# Patient Record
Sex: MALE | Race: WHITE | Employment: OTHER
[De-identification: names, ages, dates, MRNs, and addresses within clinical notes are randomized per-mention and may not be internally consistent; named-entity substitution may affect disease eponyms.]

---

## 2024-07-06 ENCOUNTER — APPOINTMENT (OUTPATIENT)
Facility: HOSPITAL | Age: 89
DRG: 065 | End: 2024-07-06
Payer: OTHER GOVERNMENT

## 2024-07-06 ENCOUNTER — HOSPITAL ENCOUNTER (INPATIENT)
Facility: HOSPITAL | Age: 89
LOS: 5 days | Discharge: NURSING, REHAB OR LONG-TERM CARE | DRG: 065 | End: 2024-07-12
Attending: EMERGENCY MEDICINE | Admitting: INTERNAL MEDICINE
Payer: OTHER GOVERNMENT

## 2024-07-06 DIAGNOSIS — R29.898 WEAKNESS OF LEFT LOWER EXTREMITY: ICD-10-CM

## 2024-07-06 DIAGNOSIS — I63.9 CEREBROVASCULAR ACCIDENT (CVA), UNSPECIFIED MECHANISM (HCC): Primary | ICD-10-CM

## 2024-07-06 DIAGNOSIS — M79.661 RIGHT CALF PAIN: ICD-10-CM

## 2024-07-06 DIAGNOSIS — R42 DIZZINESS: ICD-10-CM

## 2024-07-06 PROBLEM — G45.9 TIA (TRANSIENT ISCHEMIC ATTACK): Status: ACTIVE | Noted: 2024-07-06

## 2024-07-06 LAB
ALBUMIN SERPL-MCNC: 3.6 G/DL (ref 3.5–5)
ALBUMIN/GLOB SERPL: 1.3 (ref 1.1–2.2)
ALP SERPL-CCNC: 72 U/L (ref 45–117)
ALT SERPL-CCNC: 26 U/L (ref 12–78)
ANION GAP SERPL CALC-SCNC: 5 MMOL/L (ref 5–15)
AST SERPL W P-5'-P-CCNC: 14 U/L (ref 15–37)
BASOPHILS # BLD: 0.1 K/UL (ref 0–0.1)
BASOPHILS NFR BLD: 1 % (ref 0–1)
BILIRUB SERPL-MCNC: 0.6 MG/DL (ref 0.2–1)
BUN SERPL-MCNC: 12 MG/DL (ref 6–20)
BUN/CREAT SERPL: 20 (ref 12–20)
CA-I BLD-MCNC: 9.3 MG/DL (ref 8.5–10.1)
CHLORIDE SERPL-SCNC: 108 MMOL/L (ref 97–108)
CO2 SERPL-SCNC: 26 MMOL/L (ref 21–32)
COLLECT DATE STL: NORMAL
CREAT SERPL-MCNC: 0.6 MG/DL (ref 0.7–1.3)
DIFFERENTIAL METHOD BLD: ABNORMAL
EOSINOPHIL # BLD: 0.6 K/UL (ref 0–0.4)
EOSINOPHIL NFR BLD: 9 % (ref 0–7)
ERYTHROCYTE [DISTWIDTH] IN BLOOD BY AUTOMATED COUNT: 13.5 % (ref 11.5–14.5)
GLOBULIN SER CALC-MCNC: 2.8 G/DL (ref 2–4)
GLUCOSE BLD STRIP.AUTO-MCNC: 105 MG/DL (ref 65–100)
GLUCOSE SERPL-MCNC: 120 MG/DL (ref 65–100)
HCT VFR BLD AUTO: 42.2 % (ref 36.6–50.3)
HEMOCCULT SP1 STL QL: NEGATIVE
HGB BLD-MCNC: 14.1 G/DL (ref 12.1–17)
IMM GRANULOCYTES # BLD AUTO: 0 K/UL (ref 0–0.04)
IMM GRANULOCYTES NFR BLD AUTO: 0 % (ref 0–0.5)
INR PPP: 1 (ref 0.9–1.1)
LYMPHOCYTES # BLD: 1.2 K/UL (ref 0.8–3.5)
LYMPHOCYTES NFR BLD: 19 % (ref 12–49)
MCH RBC QN AUTO: 31.5 PG (ref 26–34)
MCHC RBC AUTO-ENTMCNC: 33.4 G/DL (ref 30–36.5)
MCV RBC AUTO: 94.2 FL (ref 80–99)
MONOCYTES # BLD: 0.7 K/UL (ref 0–1)
MONOCYTES NFR BLD: 10 % (ref 5–13)
NEUTS SEG # BLD: 3.9 K/UL (ref 1.8–8)
NEUTS SEG NFR BLD: 61 % (ref 32–75)
NRBC # BLD: 0 K/UL (ref 0–0.01)
NRBC BLD-RTO: 0 PER 100 WBC
PERFORMED BY:: ABNORMAL
PLATELET # BLD AUTO: 188 K/UL (ref 150–400)
PMV BLD AUTO: 9.5 FL (ref 8.9–12.9)
POTASSIUM SERPL-SCNC: 4 MMOL/L (ref 3.5–5.1)
PROT SERPL-MCNC: 6.4 G/DL (ref 6.4–8.2)
PROTHROMBIN TIME: 13.5 SEC (ref 11.9–14.6)
RBC # BLD AUTO: 4.48 M/UL (ref 4.1–5.7)
SODIUM SERPL-SCNC: 139 MMOL/L (ref 136–145)
TROPONIN I SERPL HS-MCNC: 4 NG/L (ref 0–76)
WBC # BLD AUTO: 6.4 K/UL (ref 4.1–11.1)

## 2024-07-06 PROCEDURE — 93005 ELECTROCARDIOGRAM TRACING: CPT | Performed by: EMERGENCY MEDICINE

## 2024-07-06 PROCEDURE — 70496 CT ANGIOGRAPHY HEAD: CPT

## 2024-07-06 PROCEDURE — 94761 N-INVAS EAR/PLS OXIMETRY MLT: CPT

## 2024-07-06 PROCEDURE — 84484 ASSAY OF TROPONIN QUANT: CPT

## 2024-07-06 PROCEDURE — 96360 HYDRATION IV INFUSION INIT: CPT

## 2024-07-06 PROCEDURE — 70450 CT HEAD/BRAIN W/O DYE: CPT

## 2024-07-06 PROCEDURE — 6370000000 HC RX 637 (ALT 250 FOR IP): Performed by: EMERGENCY MEDICINE

## 2024-07-06 PROCEDURE — 36415 COLL VENOUS BLD VENIPUNCTURE: CPT

## 2024-07-06 PROCEDURE — 6360000004 HC RX CONTRAST MEDICATION: Performed by: EMERGENCY MEDICINE

## 2024-07-06 PROCEDURE — 4A03X5D MEASUREMENT OF ARTERIAL FLOW, INTRACRANIAL, EXTERNAL APPROACH: ICD-10-PCS | Performed by: STUDENT IN AN ORGANIZED HEALTH CARE EDUCATION/TRAINING PROGRAM

## 2024-07-06 PROCEDURE — 82272 OCCULT BLD FECES 1-3 TESTS: CPT

## 2024-07-06 PROCEDURE — 0042T CT BRAIN PERFUSION: CPT

## 2024-07-06 PROCEDURE — G0378 HOSPITAL OBSERVATION PER HR: HCPCS

## 2024-07-06 PROCEDURE — 85610 PROTHROMBIN TIME: CPT

## 2024-07-06 PROCEDURE — 2580000003 HC RX 258: Performed by: INTERNAL MEDICINE

## 2024-07-06 PROCEDURE — 6370000000 HC RX 637 (ALT 250 FOR IP): Performed by: INTERNAL MEDICINE

## 2024-07-06 PROCEDURE — 80053 COMPREHEN METABOLIC PANEL: CPT

## 2024-07-06 PROCEDURE — 85025 COMPLETE CBC W/AUTO DIFF WBC: CPT

## 2024-07-06 PROCEDURE — 99285 EMERGENCY DEPT VISIT HI MDM: CPT

## 2024-07-06 PROCEDURE — 71045 X-RAY EXAM CHEST 1 VIEW: CPT

## 2024-07-06 PROCEDURE — 82962 GLUCOSE BLOOD TEST: CPT

## 2024-07-06 RX ORDER — ATORVASTATIN CALCIUM 40 MG/1
80 TABLET, FILM COATED ORAL NIGHTLY
Status: DISCONTINUED | OUTPATIENT
Start: 2024-07-06 | End: 2024-07-12 | Stop reason: HOSPADM

## 2024-07-06 RX ORDER — ASPIRIN 300 MG/1
300 SUPPOSITORY RECTAL DAILY
Status: DISCONTINUED | OUTPATIENT
Start: 2024-07-07 | End: 2024-07-12 | Stop reason: HOSPADM

## 2024-07-06 RX ORDER — ONDANSETRON 4 MG/1
4 TABLET, ORALLY DISINTEGRATING ORAL EVERY 8 HOURS PRN
Status: DISCONTINUED | OUTPATIENT
Start: 2024-07-06 | End: 2024-07-12 | Stop reason: HOSPADM

## 2024-07-06 RX ORDER — ASPIRIN 325 MG
325 TABLET ORAL
Status: COMPLETED | OUTPATIENT
Start: 2024-07-06 | End: 2024-07-06

## 2024-07-06 RX ORDER — SODIUM CHLORIDE 0.9 % (FLUSH) 0.9 %
5-40 SYRINGE (ML) INJECTION PRN
Status: DISCONTINUED | OUTPATIENT
Start: 2024-07-06 | End: 2024-07-12 | Stop reason: HOSPADM

## 2024-07-06 RX ORDER — ASPIRIN 81 MG/1
81 TABLET, CHEWABLE ORAL DAILY
Status: DISCONTINUED | OUTPATIENT
Start: 2024-07-07 | End: 2024-07-12 | Stop reason: HOSPADM

## 2024-07-06 RX ORDER — POLYETHYLENE GLYCOL 3350 17 G/17G
17 POWDER, FOR SOLUTION ORAL DAILY PRN
Status: DISCONTINUED | OUTPATIENT
Start: 2024-07-06 | End: 2024-07-12 | Stop reason: HOSPADM

## 2024-07-06 RX ORDER — SODIUM CHLORIDE 0.9 % (FLUSH) 0.9 %
5-40 SYRINGE (ML) INJECTION EVERY 12 HOURS SCHEDULED
Status: DISCONTINUED | OUTPATIENT
Start: 2024-07-06 | End: 2024-07-12 | Stop reason: HOSPADM

## 2024-07-06 RX ORDER — SODIUM CHLORIDE 9 MG/ML
INJECTION, SOLUTION INTRAVENOUS CONTINUOUS
Status: DISCONTINUED | OUTPATIENT
Start: 2024-07-06 | End: 2024-07-12

## 2024-07-06 RX ORDER — SODIUM CHLORIDE 9 MG/ML
INJECTION, SOLUTION INTRAVENOUS PRN
Status: DISCONTINUED | OUTPATIENT
Start: 2024-07-06 | End: 2024-07-12 | Stop reason: HOSPADM

## 2024-07-06 RX ORDER — ENOXAPARIN SODIUM 100 MG/ML
40 INJECTION SUBCUTANEOUS DAILY
Status: DISCONTINUED | OUTPATIENT
Start: 2024-07-06 | End: 2024-07-12 | Stop reason: HOSPADM

## 2024-07-06 RX ORDER — ONDANSETRON 2 MG/ML
4 INJECTION INTRAMUSCULAR; INTRAVENOUS EVERY 6 HOURS PRN
Status: DISCONTINUED | OUTPATIENT
Start: 2024-07-06 | End: 2024-07-12 | Stop reason: HOSPADM

## 2024-07-06 RX ADMIN — SODIUM CHLORIDE: 9 INJECTION, SOLUTION INTRAVENOUS at 23:00

## 2024-07-06 RX ADMIN — ASPIRIN 325 MG: 325 TABLET ORAL at 17:08

## 2024-07-06 RX ADMIN — IOPAMIDOL 100 ML: 755 INJECTION, SOLUTION INTRAVENOUS at 15:30

## 2024-07-06 RX ADMIN — ATORVASTATIN CALCIUM 80 MG: 40 TABLET, FILM COATED ORAL at 23:00

## 2024-07-06 RX ADMIN — SODIUM CHLORIDE, PRESERVATIVE FREE 10 ML: 5 INJECTION INTRAVENOUS at 23:00

## 2024-07-06 NOTE — ED PROVIDER NOTES
[facility] EMERGENCY DEPT  EMERGENCY DEPARTMENT HISTORY AND PHYSICAL EXAM      Date: 7/6/2024  Patient Name: Chance Blue Sr.  MRN: [id number]  Birthdate 12/25/1934  Date of evaluation: 7/6/2024  Provider: Bhavik Cuba MD   Note Started: 4:09 PM EDT 7/6/24    HISTORY OF PRESENT ILLNESS     Chief Complaint   Patient presents with    Extremity Weakness    Dizziness    Altered Mental Status       History Provided By: Patient    HPI: Chance Blue Sr. is a 89 y.o. male presents for evaluation of dizziness that started approximate 10:00 this morning when he got up out of bed.  Patient reports he noted some unsteadiness and left leg weakness associated with that episode.  Patient's family noted some issues with difficulty speaking at that time which has resolved.  Patient states he had a similar episode in the past that he was told was because because he was dehydrated.  Denies chest pain or shortness of breath, denies fevers or chills, denies nausea or vomiting.    PAST MEDICAL HISTORY   Past Medical History:  No past medical history on file.    Past Surgical History:  No past surgical history on file.    Family History:  No family history on file.    Social History:       Allergies:  Allergies   Allergen Reactions    Penicillins Diarrhea       PCP: No, Pcp    Current Meds:   No current facility-administered medications for this encounter.     No current outpatient medications on file.       Social Determinants of Health:   Social Determinants of Health     Tobacco Use: Not on file   Alcohol Use: Not on file   Financial Resource Strain: Not on file   Food Insecurity: Not on file   Transportation Needs: Not on file   Physical Activity: Not on file   Stress: Not on file   Social Connections: Not on file   Intimate Partner Violence: Not on file   Depression: Not on file   Housing Stability: Not on file   Interpersonal Safety: Not on file   Utilities: Not on file       PHYSICAL EXAM   Physical Exam  Physical  past 12 hour(s))   CBC with Auto Differential    Collection Time: 07/06/24  3:30 PM   Result Value Ref Range    WBC 6.4 4.1 - 11.1 K/uL    RBC 4.48 4.10 - 5.70 M/uL    Hemoglobin 14.1 12.1 - 17.0 g/dL    Hematocrit 42.2 36.6 - 50.3 %    MCV 94.2 80.0 - 99.0 FL    MCH 31.5 26.0 - 34.0 PG    MCHC 33.4 30.0 - 36.5 g/dL    RDW 13.5 11.5 - 14.5 %    Platelets 188 150 - 400 K/uL    MPV 9.5 8.9 - 12.9 FL    Nucleated RBCs 0.0 0.0  WBC    nRBC 0.00 0.00 - 0.01 K/uL    Neutrophils % 61 32 - 75 %    Lymphocytes % 19 12 - 49 %    Monocytes % 10 5 - 13 %    Eosinophils % 9 (H) 0 - 7 %    Basophils % 1 0 - 1 %    Immature Granulocytes % 0 0 - 0.5 %    Neutrophils Absolute 3.9 1.8 - 8.0 K/UL    Lymphocytes Absolute 1.2 0.8 - 3.5 K/UL    Monocytes Absolute 0.7 0.0 - 1.0 K/UL    Eosinophils Absolute 0.6 (H) 0.0 - 0.4 K/UL    Basophils Absolute 0.1 0.0 - 0.1 K/UL    Immature Granulocytes Absolute 0.0 0.00 - 0.04 K/UL    Differential Type AUTOMATED     Comprehensive Metabolic Panel    Collection Time: 07/06/24  3:30 PM   Result Value Ref Range    Sodium 139 136 - 145 mmol/L    Potassium 4.0 3.5 - 5.1 mmol/L    Chloride 108 97 - 108 mmol/L    CO2 26 21 - 32 mmol/L    Anion Gap 5 5 - 15 mmol/L    Glucose 120 (H) 65 - 100 mg/dL    BUN 12 6 - 20 mg/dL    Creatinine 0.60 (L) 0.70 - 1.30 mg/dL    BUN/Creatinine Ratio 20 12 - 20      Est, Glom Filt Rate >90 >60 ml/min/1.73m2    Calcium 9.3 8.5 - 10.1 mg/dL    Total Bilirubin 0.6 0.2 - 1.0 mg/dL    AST 14 (L) 15 - 37 U/L    ALT 26 12 - 78 U/L    Alk Phosphatase 72 45 - 117 U/L    Total Protein 6.4 6.4 - 8.2 g/dL    Albumin 3.6 3.5 - 5.0 g/dL    Globulin 2.8 2.0 - 4.0 g/dL    Albumin/Globulin Ratio 1.3 1.1 - 2.2     Troponin    Collection Time: 07/06/24  3:30 PM   Result Value Ref Range    Troponin, High Sensitivity 4 0 - 76 ng/L   POCT Glucose    Collection Time: 07/06/24  3:40 PM   Result Value Ref Range    POC Glucose 105 (H) 65 - 100 mg/dL    Performed by: Mackenzie Baldwin

## 2024-07-06 NOTE — ED TRIAGE NOTES
LKW 1030 this am, dizziness, along with some left leg weakness, vision changes with hx of vision issues,

## 2024-07-06 NOTE — H&P
Hospitalist Admission Note    NAME:   Chance Blue Sr.   : 1934   MRN: [id number]     Date/Time: 2024 6:31 PM    Patient PCP: Antoinette, Pcp    ______________________________________________________________________  Given the patient's current clinical presentation, I have a high level of concern for decompensation if discharged from the emergency department.  Complex decision making was performed, which includes reviewing the patient's available past medical records, laboratory results, and x-ray films.       My assessment of this patient's clinical condition and my plan of care is as follows.    Assessment / Plan:    TIA  Generalized ataxia noted without any other focal weakness.  CT angiogram head and neck with complete occlusion of right PCA however this may be chronic.  Teleneurology consultation completed and no indication for evaluation by neurointervention.  Obtain MRI of the brain.  Obtain echocardiogram.  PT/OT/ST.  Ataxia also could be secondary to significant dehydration as patient was out in the heat working outside for several hours.  Continue patient on low-dose aspirin and Lipitor for now    Hypertension  No previous diagnosis as patient is not on any chronic medications.  Will administer IV fluids as this may be compensation for dehydration.  Check TSH.    DVT prophylaxis  Lovenox SC    Full CODE STATUS    Medical Decision Making:   I personally reviewed labs: CBC, CMP, troponin, INR  I personally reviewed imaging: CT angiogram head and neck, chest x-ray, CT brain perfusion  I personally reviewed EKG:  Toxic drug monitoring:     Discussed case with: ED provider. After discussion I am in agreement that acuity of patient's medical condition necessitates hospital stay.    Subjective:   CHIEF COMPLAINT: Difficulty walking    HISTORY OF PRESENT ILLNESS:     Chance Blue is a 89 y.o.  male with no significant PMH as patient currently on any medications presents and brought by family for ataxia

## 2024-07-06 NOTE — ED NOTES
C5-C7 anterior spinal fusion. Multilevel spondylosis cervical spine   as detailed.         Electronically signed by JANICE WILLIAMSON      CT HEAD WO CONTRAST   Final Result      CT Head:         No evidence of acute intracranial abnormality.         CT Brain Perfusion:   No blood flow or volume deficits to suggest acute ischemia or tissue at ischemic   risk.      CTA Head:   Patchy high-grade stenosis with areas of near complete occlusion along the right   PCA. Findings may represent noncalcified atherosclerotic plaque versus   vasospasm. Clinical correlation advised.   No intracranial aneurysm.      CTA Neck:   No large vessel occlusion or significant flow-limiting stenosis.      Others:   Sequelae of C5-C7 anterior spinal fusion. Multilevel spondylosis cervical spine   as detailed.         Electronically signed by [email or web address] WILLIAMSON      MRI BRAIN WO CONTRAST    (Results Pending)     Abnormal labs:   Abnormal Labs Reviewed   CBC WITH AUTO DIFFERENTIAL - Abnormal; Notable for the following components:       Result Value    Eosinophils % 9 (*)     Eosinophils Absolute 0.6 (*)     All other components within normal limits   COMPREHENSIVE METABOLIC PANEL - Abnormal; Notable for the following components:    Glucose 120 (*)     Creatinine 0.60 (*)     AST 14 (*)     All other components within normal limits   POCT GLUCOSE - Abnormal; Notable for the following components:    POC Glucose 105 (*)     All other components within normal limits       Background  Allergies:   Allergies   Allergen Reactions    Penicillins Diarrhea     History: No past medical history on file.    Assessment  Vitals:    Level of Consciousness: Alert (0)   Vitals:    07/06/24 1715 07/06/24 1717 07/06/24 1718 07/06/24 1800   BP: (!) 145/84 (!) 169/94 (!) 171/89 (!) 162/92   Pulse: 64 68 67 66   Resp:       Temp:       TempSrc:       SpO2:    96%   Weight:       Height:         Deterioration Index (DI): Deterioration Index: 20.74  Deterioration Index  (325 mg Oral Given 7/6/24 7098)     Last documented pain medication administration: n/a  Pertinent or High Risk Medications/Drips: no   If Yes, please provide details:   Blood Product Administration: no  If Yes, please provide details:   Process Protocols/Bundles: Stroke alert    Recommendation  Incomplete STAT orders: no  Overdue Medications: no  Patient Belongings:    Additional Comments: n/a  If any further questions, please call Sending RN at [phone]      Admitting Unit Notification  Name of person notified and time: Malena at 7 pm      Electronically signed by: Electronically signed by Zulay Alva RN on 7/6/2024 at 6:57 PM

## 2024-07-07 ENCOUNTER — APPOINTMENT (OUTPATIENT)
Facility: HOSPITAL | Age: 89
DRG: 065 | End: 2024-07-07
Attending: INTERNAL MEDICINE
Payer: OTHER GOVERNMENT

## 2024-07-07 ENCOUNTER — APPOINTMENT (OUTPATIENT)
Facility: HOSPITAL | Age: 89
DRG: 065 | End: 2024-07-07
Payer: OTHER GOVERNMENT

## 2024-07-07 PROBLEM — I63.9 ACUTE CVA (CEREBROVASCULAR ACCIDENT) (HCC): Status: ACTIVE | Noted: 2024-07-07

## 2024-07-07 LAB
CHOLEST SERPL-MCNC: 122 MG/DL
ECHO AO ASC DIAM: 3 CM
ECHO AO ASCENDING AORTA INDEX: 1.78 CM/M2
ECHO AO ROOT DIAM: 3.7 CM
ECHO AO ROOT INDEX: 2.19 CM/M2
ECHO AV AREA PEAK VELOCITY: 3.5 CM2
ECHO AV AREA VTI: 4.1 CM2
ECHO AV AREA/BSA PEAK VELOCITY: 2.1 CM2/M2
ECHO AV AREA/BSA VTI: 2.4 CM2/M2
ECHO AV MEAN GRADIENT: 2 MMHG
ECHO AV MEAN VELOCITY: 0.7 M/S
ECHO AV PEAK GRADIENT: 5 MMHG
ECHO AV PEAK VELOCITY: 1.2 M/S
ECHO AV VELOCITY RATIO: 0.92
ECHO AV VTI: 19.5 CM
ECHO BSA: 1.7 M2
ECHO EST RA PRESSURE: 3 MMHG
ECHO LA AREA 2C: 11.3 CM2
ECHO LA AREA 4C: 11.5 CM2
ECHO LA DIAMETER INDEX: 1.42 CM/M2
ECHO LA DIAMETER: 2.4 CM
ECHO LA MAJOR AXIS: 4.5 CM
ECHO LA MINOR AXIS: 4.5 CM
ECHO LA TO AORTIC ROOT RATIO: 0.65
ECHO LA VOL BP: 22 ML (ref 18–58)
ECHO LA VOL MOD A2C: 23 ML (ref 18–58)
ECHO LA VOL MOD A4C: 21 ML (ref 18–58)
ECHO LA VOL/BSA BIPLANE: 13 ML/M2 (ref 16–34)
ECHO LA VOLUME INDEX MOD A2C: 14 ML/M2 (ref 16–34)
ECHO LA VOLUME INDEX MOD A4C: 12 ML/M2 (ref 16–34)
ECHO LV E' LATERAL VELOCITY: 8 CM/S
ECHO LV E' SEPTAL VELOCITY: 8 CM/S
ECHO LV EDV A2C: 27 ML
ECHO LV EDV A4C: 49 ML
ECHO LV EDV INDEX A4C: 29 ML/M2
ECHO LV EDV NDEX A2C: 16 ML/M2
ECHO LV EJECTION FRACTION A2C: 85 %
ECHO LV EJECTION FRACTION A4C: 76 %
ECHO LV EJECTION FRACTION BIPLANE: 80 % (ref 55–100)
ECHO LV ESV A2C: 4 ML
ECHO LV ESV A4C: 12 ML
ECHO LV ESV INDEX A2C: 2 ML/M2
ECHO LV ESV INDEX A4C: 7 ML/M2
ECHO LV FRACTIONAL SHORTENING: 26 % (ref 28–44)
ECHO LV INTERNAL DIMENSION DIASTOLE INDEX: 2.78 CM/M2
ECHO LV INTERNAL DIMENSION DIASTOLIC: 4.7 CM (ref 4.2–5.9)
ECHO LV INTERNAL DIMENSION SYSTOLIC INDEX: 2.07 CM/M2
ECHO LV INTERNAL DIMENSION SYSTOLIC: 3.5 CM
ECHO LV IVSD: 0.7 CM (ref 0.6–1)
ECHO LV MASS 2D: 132.3 G (ref 88–224)
ECHO LV MASS INDEX 2D: 78.3 G/M2 (ref 49–115)
ECHO LV POSTERIOR WALL DIASTOLIC: 1 CM (ref 0.6–1)
ECHO LV RELATIVE WALL THICKNESS RATIO: 0.43
ECHO LVOT AREA: 3.8 CM2
ECHO LVOT AV VTI INDEX: 1.08
ECHO LVOT DIAM: 2.2 CM
ECHO LVOT MEAN GRADIENT: 2 MMHG
ECHO LVOT PEAK GRADIENT: 5 MMHG
ECHO LVOT PEAK VELOCITY: 1.1 M/S
ECHO LVOT STROKE VOLUME INDEX: 47.4 ML/M2
ECHO LVOT SV: 80.2 ML
ECHO LVOT VTI: 21.1 CM
ECHO MV A VELOCITY: 1.18 M/S
ECHO MV E VELOCITY: 0.76 M/S
ECHO MV E/A RATIO: 0.64
ECHO MV E/E' LATERAL: 9.5
ECHO MV E/E' RATIO (AVERAGED): 9.5
ECHO MV E/E' SEPTAL: 9.5
ECHO PV MAX VELOCITY: 0.7 M/S
ECHO PV MEAN GRADIENT: 1 MMHG
ECHO PV MEAN VELOCITY: 0.5 M/S
ECHO PV PEAK GRADIENT: 2 MMHG
ECHO PV VTI: 12.5 CM
ECHO RIGHT VENTRICULAR SYSTOLIC PRESSURE (RVSP): 15 MMHG
ECHO RV BASAL DIMENSION: 3.2 CM
ECHO RV MID DIMENSION: 2.6 CM
ECHO TV REGURGITANT MAX VELOCITY: 1.75 M/S
ECHO TV REGURGITANT PEAK GRADIENT: 12 MMHG
EKG ATRIAL RATE: 71 BPM
EKG DIAGNOSIS: NORMAL
EKG P AXIS: 25 DEGREES
EKG P-R INTERVAL: 314 MS
EKG Q-T INTERVAL: 424 MS
EKG QRS DURATION: 128 MS
EKG QTC CALCULATION (BAZETT): 460 MS
EKG R AXIS: -38 DEGREES
EKG T AXIS: -27 DEGREES
EKG VENTRICULAR RATE: 71 BPM
EST. AVERAGE GLUCOSE BLD GHB EST-MCNC: 117 MG/DL
HBA1C MFR BLD: 5.7 % (ref 4–5.6)
HDLC SERPL-MCNC: 55 MG/DL
HDLC SERPL: 2.2 (ref 0–5)
LDLC SERPL CALC-MCNC: 53.4 MG/DL (ref 0–100)
LIPID PANEL: NORMAL
TRIGL SERPL-MCNC: 68 MG/DL
TSH SERPL DL<=0.05 MIU/L-ACNC: 3.58 UIU/ML (ref 0.36–3.74)
VLDLC SERPL CALC-MCNC: 13.6 MG/DL

## 2024-07-07 PROCEDURE — G0378 HOSPITAL OBSERVATION PER HR: HCPCS

## 2024-07-07 PROCEDURE — 94761 N-INVAS EAR/PLS OXIMETRY MLT: CPT

## 2024-07-07 PROCEDURE — 96372 THER/PROPH/DIAG INJ SC/IM: CPT

## 2024-07-07 PROCEDURE — 36415 COLL VENOUS BLD VENIPUNCTURE: CPT

## 2024-07-07 PROCEDURE — 97165 OT EVAL LOW COMPLEX 30 MIN: CPT

## 2024-07-07 PROCEDURE — 84443 ASSAY THYROID STIM HORMONE: CPT

## 2024-07-07 PROCEDURE — 99221 1ST HOSP IP/OBS SF/LOW 40: CPT | Performed by: PSYCHIATRY & NEUROLOGY

## 2024-07-07 PROCEDURE — 97530 THERAPEUTIC ACTIVITIES: CPT

## 2024-07-07 PROCEDURE — 1100000000 HC RM PRIVATE

## 2024-07-07 PROCEDURE — 83036 HEMOGLOBIN GLYCOSYLATED A1C: CPT

## 2024-07-07 PROCEDURE — 80061 LIPID PANEL: CPT

## 2024-07-07 PROCEDURE — 93306 TTE W/DOPPLER COMPLETE: CPT

## 2024-07-07 PROCEDURE — 97161 PT EVAL LOW COMPLEX 20 MIN: CPT

## 2024-07-07 PROCEDURE — 96361 HYDRATE IV INFUSION ADD-ON: CPT

## 2024-07-07 PROCEDURE — 6370000000 HC RX 637 (ALT 250 FOR IP): Performed by: PSYCHIATRY & NEUROLOGY

## 2024-07-07 PROCEDURE — 6370000000 HC RX 637 (ALT 250 FOR IP): Performed by: INTERNAL MEDICINE

## 2024-07-07 PROCEDURE — 2580000003 HC RX 258: Performed by: INTERNAL MEDICINE

## 2024-07-07 PROCEDURE — 92610 EVALUATE SWALLOWING FUNCTION: CPT

## 2024-07-07 PROCEDURE — 70551 MRI BRAIN STEM W/O DYE: CPT

## 2024-07-07 PROCEDURE — 6360000002 HC RX W HCPCS: Performed by: INTERNAL MEDICINE

## 2024-07-07 RX ORDER — ACETAMINOPHEN 325 MG/1
650 TABLET ORAL EVERY 6 HOURS PRN
Status: DISCONTINUED | OUTPATIENT
Start: 2024-07-07 | End: 2024-07-12 | Stop reason: HOSPADM

## 2024-07-07 RX ORDER — CLOPIDOGREL BISULFATE 75 MG/1
75 TABLET ORAL DAILY
Status: DISCONTINUED | OUTPATIENT
Start: 2024-07-07 | End: 2024-07-12 | Stop reason: HOSPADM

## 2024-07-07 RX ADMIN — ACETAMINOPHEN 650 MG: 325 TABLET ORAL at 17:06

## 2024-07-07 RX ADMIN — ATORVASTATIN CALCIUM 80 MG: 40 TABLET, FILM COATED ORAL at 21:17

## 2024-07-07 RX ADMIN — ENOXAPARIN SODIUM 40 MG: 100 INJECTION SUBCUTANEOUS at 09:11

## 2024-07-07 RX ADMIN — SODIUM CHLORIDE, PRESERVATIVE FREE 10 ML: 5 INJECTION INTRAVENOUS at 21:18

## 2024-07-07 RX ADMIN — CLOPIDOGREL BISULFATE 75 MG: 75 TABLET ORAL at 16:26

## 2024-07-07 RX ADMIN — SODIUM CHLORIDE: 9 INJECTION, SOLUTION INTRAVENOUS at 16:26

## 2024-07-07 RX ADMIN — ASPIRIN 81 MG 81 MG: 81 TABLET ORAL at 09:12

## 2024-07-07 NOTE — PROGRESS NOTES
4 Eyes Skin Assessment     NAME:  Chance Blue Sr.  YOB: 1934  MEDICAL RECORD NUMBER:  [id number]    The patient is being assessed for  Admission    I agree that at least one RN has performed a thorough Head to Toe Skin Assessment on the patient. ALL assessment sites listed below have been assessed.      Areas assessed by both nurses:    Head, Face, Ears, Shoulders, Back, Chest, Arms, Elbows, Hands, Sacrum. Buttock, Coccyx, Ischium, Legs. Feet and Heels, and Under Medical Devices         Does the Patient have a Wound? No noted wound(s)       Chase Prevention initiated by RN: No  Wound Care Orders initiated by RN: No    Pressure Injury (Stage 3,4, Unstageable, DTI, NWPT, and Complex wounds) if present, place Wound referral order by RN under : No    New Ostomies, if present place, Ostomy referral order under : No     Nurse 1 eSignature: Electronically signed by Pedro Luis Rasmussen RN on 7/6/24 at 11:36 PM EDT    **SHARE this note so that the co-signing nurse can place an eSignature**    Nurse 2 eSignature: Electronically signed by Sandra Ovalles RN on 7/6/24 at 11:45 PM EDT

## 2024-07-07 NOTE — PROGRESS NOTES
Speech LAnguage Pathology Dysphagia EVALUATION/DISCHARGE    Patient: Chance Blue  (89 y.o. male)  Date: 7/7/2024  Primary Diagnosis: TIA (transient ischemic attack) [G45.9]  Dizziness [R42]  Weakness of left lower extremity [R29.898]  Cerebrovascular accident (CVA), unspecified mechanism (HCC) [I63.9]       Precautions: GERD                  DIET RECOMMENDATIONS: Regular and thin liquids    SWALLOW SAFETY PRECAUTIONS: Upright positioning during po intake and after po intake for at least 30-60 minutes, slow rate, small-single sips and bites   ASSESSMENT :  Patient seen upright in bed, alert and cooperative. Ox4.  Upon inspection dentition natural and largely intact  PO trials: regular and soft solids, puree, and thin liquids   Based on the objective data described below, the patient presents with WFL swallow function characterized by the following  Oral phase: adequately clears oral cavity  Pharyngeal phase: swallow initiation min delayed and HLE WFL upon palpation  Overt s/sx of aspiration/penetration: none    Concerns: GERD precautions advised and education provided.     Patient will be discharged from skilled speech-language pathology services at this time.     PLAN :  Recommendations and Planned Interventions:  DIET RECOMMENDATIONS: Regular and thin liquids    SWALLOW SAFETY PRECAUTIONS: Upright positioning during po intake and after po intake for at least 30-60 minutes, slow rate, small-single sips and bites   Acute SLP Services: No, patient will be discharged from acute skilled speech-language pathology at this time.    Discharge Recommendations: None     SUBJECTIVE:   Im okay, My speech is baseline and I don't have difficulty with my language.     OBJECTIVE:   No past medical history on file.No past surgical history on file.  Prior Level of Function/Home Situation:         Start of Session End of Session   Heart Rate 76 78   SPO2 98 99       Cognitive and Communication Status:  Neurologic State:

## 2024-07-07 NOTE — PLAN OF CARE
OCCUPATIONAL THERAPY EVALUATION  Patient: Chance Blue  (89 y.o. male)  Date: 7/7/2024  Primary Diagnosis: TIA (transient ischemic attack) [G45.9]  Dizziness [R42]  Weakness of left lower extremity [R29.898]  Cerebrovascular accident (CVA), unspecified mechanism (HCC) [I63.9]  Acute CVA (cerebrovascular accident) (HCC) [I63.9]       Precautions: General Precautions, Bed Alarm, Fall Risk                Recommendations for nursing mobility: Out of bed to chair for meals, Frequent repositioning to prevent skin breakdown, Use of bed/chair alarm for safety, Use of BSC for toileting , AD and gt belt for bed to chair , and Assist x1    In place during session:Peripheral IV and EKG/telemetry   ASSESSMENT  Pt is a 89 y.o. male presenting to [facility] with c/o dizziness, slurred speech, and R sided weakness, admitted 7/6 and currently being treated for acute CVA. MRI on 7/7 finding small acute infarction posterior left corona radiata. Pt AXO x4, and agreeable to OT evaluation--wife present.     Based on current observations, pt presents with decreased  functional mobility, independence in ADLs, high-level IADLs, ROM, strength, sensation, body mechanics, activity tolerance, endurance, safety awareness, coordination, balance, proprioception, fine-motor control (see below for objective details and assist levels).     Overall, pt tolerates session fair with no c/o pain or dizziness. Pt encountered sitting EOB eating lunch with LUE, pt is R hand dominant. Pt reports new R sided weakness RUE 2+/5, impaired R FMC. Pt with sensation deficits RUE noted to feel like leather and heavy. Pt completes sit<>stand using fww with Min A increased time, reports feeling dizzy and advised to take a seat; /87 sitting EOB. Pt stands again with min A to fww and /84, dizziness improved. Pt ambulates 5 steps fwd and back with Min A and increased time, pt with difficulty advancing R foot. Pt returns to sit EOB and transitions to supine with

## 2024-07-07 NOTE — CARE COORDINATION
07/07/24 1610   Service Assessment   Patient Orientation Alert and Oriented   Cognition Alert   History Provided By Patient;Significant Other   Primary Caregiver Self   Accompanied By/Relationship Tonya Blue   Support Systems Spouse/Significant Other   PCP Verified by CM Yes  (Dr Alex@ [facility] in [place])   Last Visit to PCP Within last 3 months   Prior Functional Level Independent in ADLs/IADLs   Current Functional Level Independent in ADLs/IADLs   Can patient return to prior living arrangement Yes   Ability to make needs known: Good   Family able to assist with home care needs: Yes   Would you like for me to discuss the discharge plan with any other family members/significant others, and if so, who? Yes  (Consent received to assess with wife)   [facility] Resources  ([facility])  (If continued care is needed, the pts wife/[facility] will [facility].  Currently OBS)   Community Resources None   Social/Functional History   Lives With Spouse     OBS assess.  Met with the pt and his wife at bedside.  CM to follow for Therapy needs.    Ms Blue will transport at [place]  Advance Care Planning     General Advance Care Planning (ACP) Conversation    Date of Conversation: 7/7/2024  Conducted with: Patient with Decision Making Capacity  Other persons present: Spouse      Healthcare Decision Maker: No healthcare decision makers have been documented.  Click here to complete HealthCare Decision Makers including selection of the Healthcare Decision Maker Relationship (ie \"Primary\")       Content/Action Overview:  Has ACP document(s) on file - reflects the patient's care preferences  Reviewed DNR/DNI and patient elects Full Code (Attempt Resuscitation)        Length of Voluntary ACP Conversation in minutes:  <16 minutes (Non-Billable)    Joyce Young RN

## 2024-07-07 NOTE — PROGRESS NOTES
Hospitalist Progress Note    NAME:   Chance Blue Sr.   : 1934   MRN: [id number]     Date/Time: 2024 2:51 PM  Patient PCP: No, Pcp    Estimated discharge date:  Barriers:       Assessment / Plan:    Acute small left posterior infarct  Generalized ataxia noted without any other focal weakness.  CT angiogram head and neck with complete occlusion of right PCA however this may be chronic.  Teleneurology consultation completed and no indication for evaluation by neurointervention.  MRI of the brain performed afternoon of  with findings of small acute left posterior corona radiata infarction noted.  Pending echocardiogram.  PT/OT/ST.  Lipids and A1c normal.  Continue patient on low-dose aspirin and Lipitor for now.  PT on initial evaluation recommending inpatient rehab.  Per teleneurology, started 21 days of Plavix.  If no indication for NOAC per cardiology then transition to aspirin 81 Mg daily alone after 21 days of DAPT.     Severe ataxia  Per PT, patient will require IRF.  Consult case management for IRF placement.    Hypertension  No previous diagnosis as patient is not on any chronic medications.  Will administer IV fluids as this may be compensation for dehydration.  Normal TSH.     DVT prophylaxis  Lovenox SC     Full CODE STATUS      Medical Decision Making:   I personally reviewed labs: Lipids, BMP, CBC  I personally reviewed imaging:  I personally reviewed EKG:  Toxic drug monitoring:     Discussed case with: Teleneurology, patient, wife, nurse    Total time for POC and discussion with family and patient to approximately 45 minutes.    Subjective:     Chief Complaint / Reason for Physician Visit  \" Difficulty walking and weakness\".  Discussed with RN events overnight.      - Admission H&P  89 y.o.  male with no significant PMH as patient currently on any medications presents and brought by family for ataxia and difficulty walking and mild dizziness when he woke up at 10 AM.     Patient

## 2024-07-07 NOTE — PROGRESS NOTES
Tele neuro rounding on patient. RN unable to complete NIH and neuro checks. Will attempt once consult is complete

## 2024-07-07 NOTE — CONSULTS
[facility] NEUROLOGY CONSULT NOTE    TIA (transient ischemic attack) [G45.9]  Dizziness [R42]  Weakness of left lower extremity [R29.898]  Cerebrovascular accident (CVA), unspecified mechanism (HCC) [I63.9]- small subcortical left pca region infarct, likely embolic from anterior circulation with left fetal PCA noted but very small LICA plaque burden noted, r/o cardioembolic etiology with lightheadedness and OD partial visual loss/RUQ field defect since event. R PCA/P2 stenosis vs occlusion- appears chronic and incidental.    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  __ Small Vessel (Lacunar)  _x_ Cardioembolic- possible with presyncopal spell and possible OD BRAO concurrent, mild arch athero noted  __ Other (Sickle Cell, Vasculitis, Hypercoagulable)  __ Unknown    Target normotension slowly  BG and BP control  Lipitor 80mg or Crestor 40mg Daily  If NIHSS remains <4, consider DAPT(ASA 81mg & Plavix 75mg daily) for at least 21 days from symptom onset & no reason for full anticoagulation found   PT/OT/ST as relevant  CARD consult- If arrhythmia or abnormal EKG or abnormal TTE bubble study, then f/u with Card MD, DC with ILR if no arrhythmia found  F/u with otpt neuro asap in coming week  No further workup at this time- re consult for abnormal finding     Time spent was approximately 45 minutes.  All questions were answered.  Discussed with Frederick Yusuf MD.     Galen Dunn MD  Teleneurology Consultant  7/7/2024  2:46 PM    ?I discussed the risks and benefits of a telehealth visit and I obtained the patient's or legally authorized representative's informed verbal consent to conduct this assessment using telehealth tools.? All of the patient's or legally authorized representative's questions regarding the telehealth interaction were addressed. I provided my name and disclosed to the patient or legally authorized representative my licensure, certification,   No evidence of acute intracranial abnormality. CT Brain Perfusion: No blood flow or volume deficits to suggest acute ischemia or tissue at ischemic risk. CTA Head: Patchy high-grade stenosis with areas of near complete occlusion along the right PCA. Findings may represent noncalcified atherosclerotic plaque versus vasospasm. Clinical correlation advised. No intracranial aneurysm. CTA Neck: No large vessel occlusion or significant flow-limiting stenosis. Others: Sequelae of C5-C7 anterior spinal fusion. Multilevel spondylosis cervical spine as detailed. Electronically signed by JANICE WILLIAMSON

## 2024-07-07 NOTE — CONSULTS
General-rounding tele-neurology consult called in to [facility] call center at this time r/t stroke (MRI results).

## 2024-07-07 NOTE — PLAN OF CARE
PHYSICAL THERAPY EVALUATION  Patient: Chance Blue . (89 y.o. male)  Date: 7/7/2024  Primary Diagnosis: TIA (transient ischemic attack) [G45.9]  Dizziness [R42]  Weakness of left lower extremity [R29.898]  Cerebrovascular accident (CVA), unspecified mechanism (HCC) [I63.9]       Precautions: General Precautions, Bed Alarm, Fall Risk                      Recommendations for nursing mobility: Out of bed to chair for meals, Use of bed/chair alarm for safety, LE elevation for management of edema, AD and gt belt for bed to chair , Amb to bathroom with AD and gait belt, and Assist x1    In place during session: Peripheral IV and EKG/telemetry     ASSESSMENT  Pt is a 89 y.o. male admitted on 7/6/2024 for ataxia and difficulty walking with mild dizziness; pt currently being treated for TIA and HTN. Pt semi-supine in bed upon PT arrival, agreeable to evaluation. Pt A&O x 4.    Based on the objective data described below, the patient currently presents with impaired functional mobility, decreased independence in ADLs, decreased ROM, impaired strength, impaired sensation, poor body mechanics, decreased activity tolerance, decreased coordination, impaired balance, decreased proprioception, and decreased fine-motor control. (See below for objective details and assist levels).     Overall Ry tolerated session well today with no complaints of pain or dizziness however he reports he is well below his baseline for functional mobility. Pt required increased time with all interventions due to difficulty managing coordination and motor control for R UE/R LE. CGA for supine>sit and Min A for sit>supine for assistance lifting R LE onto the bed. Gait was min A with RW and gait belt performed x 8 feet with pt demonstrating ataxia, shuffling, decreased R LE stance time and step length, and poor ability to use his R hand on RW due to edema and limited  strength. Overall he is weak throughout his right side with inability to lift R

## 2024-07-08 ENCOUNTER — APPOINTMENT (OUTPATIENT)
Facility: HOSPITAL | Age: 89
DRG: 065 | End: 2024-07-08
Attending: PHYSICIAN ASSISTANT
Payer: OTHER GOVERNMENT

## 2024-07-08 LAB — ECHO BSA: 1.7 M2

## 2024-07-08 PROCEDURE — 6370000000 HC RX 637 (ALT 250 FOR IP): Performed by: PSYCHIATRY & NEUROLOGY

## 2024-07-08 PROCEDURE — 6370000000 HC RX 637 (ALT 250 FOR IP): Performed by: INTERNAL MEDICINE

## 2024-07-08 PROCEDURE — 1100000000 HC RM PRIVATE

## 2024-07-08 PROCEDURE — 93971 EXTREMITY STUDY: CPT

## 2024-07-08 PROCEDURE — 2580000003 HC RX 258: Performed by: INTERNAL MEDICINE

## 2024-07-08 PROCEDURE — 6360000002 HC RX W HCPCS: Performed by: INTERNAL MEDICINE

## 2024-07-08 PROCEDURE — 6360000002 HC RX W HCPCS: Performed by: PHYSICIAN ASSISTANT

## 2024-07-08 PROCEDURE — 6370000000 HC RX 637 (ALT 250 FOR IP): Performed by: PHYSICIAN ASSISTANT

## 2024-07-08 RX ORDER — KETOROLAC TROMETHAMINE 15 MG/ML
15 INJECTION, SOLUTION INTRAMUSCULAR; INTRAVENOUS EVERY 6 HOURS PRN
Status: DISCONTINUED | OUTPATIENT
Start: 2024-07-08 | End: 2024-07-09

## 2024-07-08 RX ORDER — LIDOCAINE 4 G/G
1 PATCH TOPICAL DAILY
Status: DISCONTINUED | OUTPATIENT
Start: 2024-07-08 | End: 2024-07-12 | Stop reason: HOSPADM

## 2024-07-08 RX ORDER — TRAMADOL HYDROCHLORIDE 50 MG/1
25 TABLET ORAL EVERY 6 HOURS PRN
Status: DISCONTINUED | OUTPATIENT
Start: 2024-07-08 | End: 2024-07-12 | Stop reason: HOSPADM

## 2024-07-08 RX ADMIN — ASPIRIN 81 MG 81 MG: 81 TABLET ORAL at 10:26

## 2024-07-08 RX ADMIN — SODIUM CHLORIDE, PRESERVATIVE FREE 10 ML: 5 INJECTION INTRAVENOUS at 20:29

## 2024-07-08 RX ADMIN — ACETAMINOPHEN 650 MG: 325 TABLET ORAL at 15:23

## 2024-07-08 RX ADMIN — ACETAMINOPHEN 650 MG: 325 TABLET ORAL at 07:42

## 2024-07-08 RX ADMIN — ACETAMINOPHEN 650 MG: 325 TABLET ORAL at 21:31

## 2024-07-08 RX ADMIN — SODIUM CHLORIDE: 9 INJECTION, SOLUTION INTRAVENOUS at 15:23

## 2024-07-08 RX ADMIN — KETOROLAC TROMETHAMINE 15 MG: 15 INJECTION, SOLUTION INTRAMUSCULAR; INTRAVENOUS at 12:30

## 2024-07-08 RX ADMIN — ATORVASTATIN CALCIUM 80 MG: 40 TABLET, FILM COATED ORAL at 20:27

## 2024-07-08 RX ADMIN — ENOXAPARIN SODIUM 40 MG: 100 INJECTION SUBCUTANEOUS at 10:26

## 2024-07-08 RX ADMIN — CLOPIDOGREL BISULFATE 75 MG: 75 TABLET ORAL at 10:26

## 2024-07-08 ASSESSMENT — PAIN DESCRIPTION - ORIENTATION
ORIENTATION: RIGHT

## 2024-07-08 ASSESSMENT — PAIN SCALES - GENERAL
PAINLEVEL_OUTOF10: 7
PAINLEVEL_OUTOF10: 8
PAINLEVEL_OUTOF10: 2
PAINLEVEL_OUTOF10: 7
PAINLEVEL_OUTOF10: 4
PAINLEVEL_OUTOF10: 5
PAINLEVEL_OUTOF10: 4

## 2024-07-08 ASSESSMENT — PAIN DESCRIPTION - DESCRIPTORS
DESCRIPTORS: ACHING;THROBBING
DESCRIPTORS: ACHING;SPASM;TIGHTNESS
DESCRIPTORS: ACHING
DESCRIPTORS: ACHING;THROBBING;SPASM

## 2024-07-08 ASSESSMENT — PAIN DESCRIPTION - LOCATION
LOCATION: FOOT;LEG
LOCATION: LEG
LOCATION: LEG;HEAD
LOCATION: LEG

## 2024-07-08 ASSESSMENT — ENCOUNTER SYMPTOMS
BACK PAIN: 0
VOMITING: 0
COUGH: 0
NAUSEA: 0
SHORTNESS OF BREATH: 0

## 2024-07-08 ASSESSMENT — PAIN - FUNCTIONAL ASSESSMENT
PAIN_FUNCTIONAL_ASSESSMENT: PREVENTS OR INTERFERES WITH MANY ACTIVE NOT PASSIVE ACTIVITIES
PAIN_FUNCTIONAL_ASSESSMENT: PREVENTS OR INTERFERES WITH MANY ACTIVE NOT PASSIVE ACTIVITIES

## 2024-07-08 NOTE — CARE COORDINATION
CM reviewed chart. CM with patient and spouse about IRF rec. CM [facility] and fax[facility] to [facility]. CM will continue to follow.

## 2024-07-08 NOTE — PLAN OF CARE
Problem: Discharge Planning  Goal: Discharge to home or other facility with appropriate resources  Outcome: Progressing  Flowsheets (Taken 7/7/2024 2050)  Discharge to home or other facility with appropriate resources: Identify barriers to discharge with patient and caregiver     Problem: Occupational Therapy - Adult  Goal: By Discharge: Performs self-care activities at highest level of function for planned discharge setting.  See evaluation for individualized goals.  Description: FUNCTIONAL STATUS PRIOR TO ADMISSION:  Pt lives at home with wife, IND with I/ADLs at baseline and ambulates IND with no AD. Pt gardens and does all yard work.    Occupational Therapy Goals:  Initiated 7/7/2024  1.  Patient will perform grooming with Set-up within 7 day(s).  2.  Patient will perform self-feeding with Set-up within 7 day(s).  3.  Patient will perform upper body dressing with Set-up within 7 day(s).  4.  Patient will perform toilet transfers with Contact Guard Assist  within 7 day(s).  5.  Patient will perform all aspects of toileting with Minimal Assist within 7 day(s).  6.  Patient will participate in upper extremity therapeutic exercise/activities with Supervision within 7 day(s).    7.  Patient will utilize energy conservation techniques during functional activities with verbal cues within 7 day(s).  7/7/2024 1619 by Mario Lindsey OT  Outcome: Progressing

## 2024-07-08 NOTE — PROGRESS NOTES
Hospitalist Progress Note    NAME:   Chance Blue .   : 1934   MRN: [id number]     Date/Time: 2024 2:26 PM  Patient PCP: Antoinette, Pcp    Estimated discharge date:   Barriers: cardio consult, placement    Assessment / Plan:    Acute small left posterior infarct  Generalized ataxia noted without any other focal weakness.  CT angiogram head and neck with complete occlusion of right PCA however this may be chronic.  Teleneurology consultation completed and no indication for evaluation by neurointervention.  MRI of the brain performed afternoon of  with findings of small acute left posterior corona radiata infarction noted.  Echo with EF 65 to 70%, diastolic dysfunction  PT/OT recommended IRF  Lipids and A1c normal.  Continue patient on low-dose aspirin and Lipitor for now.  Per teleneurology, started 21 days of Plavix.  If no indication for NOAC per cardiology then transition to aspirin 81 Mg daily alone after 21 days of DAPT.     Severe ataxia  Per PT, patient will require IRF.  Consult case management for IRF placement.    Hypertension  Start low-dose lisinopril    Right lower extremity pain  Patient reports cramping pain since this morning  Potassium within normal limits  Vascular duplex right lower extremity     DVT prophylaxis  Lovenox SC     Full CODE STATUS      Medical Decision Making:   I personally reviewed labs: Lipid panel, A1c  I personally reviewed imaging:  I personally reviewed EKG:  Toxic drug monitoring:     Discussed case with: patient, nurse, wife      Subjective:     Chief Complaint / Reason for Physician Visit  \" Difficulty walking and weakness\".  Discussed with RN events overnight.      - Admission H&P  89 y.o.  male with no significant PMH as patient currently on any medications presents and brought by family for ataxia and difficulty walking and mild dizziness when he woke up at 10 AM. States that he was out in the sun for several hours yesterday planting several

## 2024-07-08 NOTE — PROGRESS NOTES
Patient urinated earlier and stated that it burned and he had small amount of blood. Anmol CAMPOS notified

## 2024-07-08 NOTE — PROGRESS NOTES
Telemetry tech called and stated that patient pulse dropped to 35. Patient was asleep. Patient awakened, pulse now 67. Anmol CAMPOS notified

## 2024-07-08 NOTE — CONSULTS
CARDIOLOGY CONSULTATION    REASON FOR CONSULT: Thromboembolic CVA, echo pending     REQUESTING PROVIDER: Dr. Yusuf     CHIEF COMPLAINT:  Weakness     HISTORY OF PRESENT ILLNESS:  Chance Blue Sr. is a 89 y.o. year-old male with no significant past medical history who was evaluated today due to thromboembolic CVA, discuss need for OAC. Patient initially presented to the ED with complaints of difficulty walking and mild dizziness. Neurology following. MRI with small acute infarction left posterior corona radiata. Patient denies prior cardiac history. [facility], having [facility] physicals. Denies chest pain. Breathing stable.     Records from hospital admission course thus far reviewed.      Telemetry reviewed.  No events overnight.      INPATIENT MEDICATIONS:  Home medications reviewed.    Current Facility-Administered Medications:     lidocaine 4 % external patch 1 patch, 1 patch, TransDERmal, Daily, Asael Pimentel PA-C, 1 patch at 07/08/24 1230    ketorolac (TORADOL) injection 15 mg, 15 mg, IntraVENous, Q6H PRN, Asael Pimentel PA-C, 15 mg at 07/08/24 1230    clopidogrel (PLAVIX) tablet 75 mg, 75 mg, Oral, Daily, Galen Dunn MD, 75 mg at 07/08/24 1026    acetaminophen (TYLENOL) tablet 650 mg, 650 mg, Oral, Q6H PRN, Frederick Yusuf MD, 650 mg at 07/08/24 1523    tiotropium (SPIRIVA RESPIMAT) 2.5 MCG/ACT inhaler 2 puff, 2 puff, Inhalation, Daily RT, Frederick Yusuf MD    sodium chloride flush 0.9 % injection 5-40 mL, 5-40 mL, IntraVENous, 2 times per day, Frederick Yusuf MD, 10 mL at 07/07/24 2118    sodium chloride flush 0.9 % injection 5-40 mL, 5-40 mL, IntraVENous, PRN, Frederick Yusuf MD    0.9 % sodium chloride infusion, , IntraVENous, PRN, Frederick Yusuf MD    ondansetron (ZOFRAN-ODT) disintegrating tablet 4 mg, 4 mg, Oral, Q8H PRN **OR** ondansetron (ZOFRAN) injection 4 mg, 4 mg, IntraVENous, Q6H PRN, Frederick Yusuf MD    polyethylene glycol (GLYCOLAX)

## 2024-07-09 PROBLEM — G45.9 TIA (TRANSIENT ISCHEMIC ATTACK): Status: RESOLVED | Noted: 2024-07-06 | Resolved: 2024-07-09

## 2024-07-09 PROBLEM — I10 HTN (HYPERTENSION): Status: ACTIVE | Noted: 2024-07-09

## 2024-07-09 PROBLEM — R27.0 ATAXIA: Status: ACTIVE | Noted: 2024-07-09

## 2024-07-09 LAB
ANION GAP SERPL CALC-SCNC: 5 MMOL/L (ref 5–15)
APPEARANCE UR: CLEAR
BACTERIA URNS QL MICRO: NEGATIVE /HPF
BASOPHILS # BLD: 0 K/UL (ref 0–0.1)
BASOPHILS NFR BLD: 0 % (ref 0–1)
BILIRUB UR QL: NEGATIVE
BUN SERPL-MCNC: 8 MG/DL (ref 6–20)
BUN/CREAT SERPL: 18 (ref 12–20)
CA-I BLD-MCNC: 8.6 MG/DL (ref 8.5–10.1)
CHLORIDE SERPL-SCNC: 112 MMOL/L (ref 97–108)
CO2 SERPL-SCNC: 22 MMOL/L (ref 21–32)
COLOR UR: ABNORMAL
CREAT SERPL-MCNC: 0.45 MG/DL (ref 0.7–1.3)
DIFFERENTIAL METHOD BLD: ABNORMAL
EKG ATRIAL RATE: 67 BPM
EKG DIAGNOSIS: NORMAL
EKG P AXIS: -24 DEGREES
EKG P-R INTERVAL: 250 MS
EKG Q-T INTERVAL: 454 MS
EKG QRS DURATION: 124 MS
EKG QTC CALCULATION (BAZETT): 479 MS
EKG R AXIS: -48 DEGREES
EKG T AXIS: -49 DEGREES
EKG VENTRICULAR RATE: 67 BPM
EOSINOPHIL # BLD: 0.3 K/UL (ref 0–0.4)
EOSINOPHIL NFR BLD: 4 % (ref 0–7)
EPITH CASTS URNS QL MICRO: ABNORMAL /LPF
ERYTHROCYTE [DISTWIDTH] IN BLOOD BY AUTOMATED COUNT: 13.7 % (ref 11.5–14.5)
GLUCOSE SERPL-MCNC: 99 MG/DL (ref 65–100)
GLUCOSE UR STRIP.AUTO-MCNC: NEGATIVE MG/DL
HCT VFR BLD AUTO: 40.6 % (ref 36.6–50.3)
HGB BLD-MCNC: 13.8 G/DL (ref 12.1–17)
HGB UR QL STRIP: ABNORMAL
HYALINE CASTS URNS QL MICRO: ABNORMAL /LPF (ref 0–5)
IMM GRANULOCYTES # BLD AUTO: 0 K/UL (ref 0–0.04)
IMM GRANULOCYTES NFR BLD AUTO: 0 % (ref 0–0.5)
KETONES UR QL STRIP.AUTO: 5 MG/DL
LEUKOCYTE ESTERASE UR QL STRIP.AUTO: NEGATIVE
LYMPHOCYTES # BLD: 0.9 K/UL (ref 0.8–3.5)
LYMPHOCYTES NFR BLD: 11 % (ref 12–49)
MCH RBC QN AUTO: 31.5 PG (ref 26–34)
MCHC RBC AUTO-ENTMCNC: 34 G/DL (ref 30–36.5)
MCV RBC AUTO: 92.7 FL (ref 80–99)
MONOCYTES # BLD: 0.8 K/UL (ref 0–1)
MONOCYTES NFR BLD: 10 % (ref 5–13)
NEUTS SEG # BLD: 6.1 K/UL (ref 1.8–8)
NEUTS SEG NFR BLD: 75 % (ref 32–75)
NITRITE UR QL STRIP.AUTO: NEGATIVE
NRBC # BLD: 0 K/UL (ref 0–0.01)
NRBC BLD-RTO: 0 PER 100 WBC
PH UR STRIP: 6 (ref 5–8)
PLATELET # BLD AUTO: 203 K/UL (ref 150–400)
PMV BLD AUTO: 10.1 FL (ref 8.9–12.9)
POTASSIUM SERPL-SCNC: 3.5 MMOL/L (ref 3.5–5.1)
PROT UR STRIP-MCNC: NEGATIVE MG/DL
RBC # BLD AUTO: 4.38 M/UL (ref 4.1–5.7)
RBC #/AREA URNS HPF: ABNORMAL /HPF (ref 0–5)
SODIUM SERPL-SCNC: 139 MMOL/L (ref 136–145)
SP GR UR REFRACTOMETRY: 1.01 (ref 1–1.03)
URINE CULTURE IF INDICATED: ABNORMAL
UROBILINOGEN UR QL STRIP.AUTO: 0.1 EU/DL (ref 0.1–1)
WBC # BLD AUTO: 8.2 K/UL (ref 4.1–11.1)
WBC URNS QL MICRO: ABNORMAL /HPF (ref 0–4)

## 2024-07-09 PROCEDURE — 6370000000 HC RX 637 (ALT 250 FOR IP): Performed by: PSYCHIATRY & NEUROLOGY

## 2024-07-09 PROCEDURE — 80048 BASIC METABOLIC PNL TOTAL CA: CPT

## 2024-07-09 PROCEDURE — 94761 N-INVAS EAR/PLS OXIMETRY MLT: CPT

## 2024-07-09 PROCEDURE — 93005 ELECTROCARDIOGRAM TRACING: CPT | Performed by: PHYSICIAN ASSISTANT

## 2024-07-09 PROCEDURE — 36415 COLL VENOUS BLD VENIPUNCTURE: CPT

## 2024-07-09 PROCEDURE — 2580000003 HC RX 258: Performed by: INTERNAL MEDICINE

## 2024-07-09 PROCEDURE — 6370000000 HC RX 637 (ALT 250 FOR IP): Performed by: INTERNAL MEDICINE

## 2024-07-09 PROCEDURE — 6370000000 HC RX 637 (ALT 250 FOR IP): Performed by: PHYSICIAN ASSISTANT

## 2024-07-09 PROCEDURE — 97530 THERAPEUTIC ACTIVITIES: CPT

## 2024-07-09 PROCEDURE — 94640 AIRWAY INHALATION TREATMENT: CPT

## 2024-07-09 PROCEDURE — 81001 URINALYSIS AUTO W/SCOPE: CPT

## 2024-07-09 PROCEDURE — 85025 COMPLETE CBC W/AUTO DIFF WBC: CPT

## 2024-07-09 PROCEDURE — 1100000000 HC RM PRIVATE

## 2024-07-09 RX ORDER — ASPIRIN 81 MG/1
81 TABLET, CHEWABLE ORAL DAILY
Qty: 30 TABLET | Refills: 0 | Status: SHIPPED | OUTPATIENT
Start: 2024-07-10 | End: 2024-08-09

## 2024-07-09 RX ORDER — LISINOPRIL 5 MG/1
5 TABLET ORAL DAILY
Qty: 30 TABLET | Refills: 3 | Status: SHIPPED | OUTPATIENT
Start: 2024-07-10

## 2024-07-09 RX ORDER — CLOPIDOGREL BISULFATE 75 MG/1
75 TABLET ORAL DAILY
Qty: 18 TABLET | Refills: 0 | Status: SHIPPED | OUTPATIENT
Start: 2024-07-10 | End: 2024-07-28

## 2024-07-09 RX ORDER — ATORVASTATIN CALCIUM 80 MG/1
80 TABLET, FILM COATED ORAL NIGHTLY
Qty: 30 TABLET | Refills: 3 | Status: SHIPPED | OUTPATIENT
Start: 2024-07-09

## 2024-07-09 RX ORDER — LIDOCAINE 4 G/G
1 PATCH TOPICAL DAILY
Qty: 5 EACH | Refills: 0 | Status: SHIPPED | OUTPATIENT
Start: 2024-07-10 | End: 2024-07-15

## 2024-07-09 RX ORDER — LISINOPRIL 10 MG/1
5 TABLET ORAL DAILY
Status: DISCONTINUED | OUTPATIENT
Start: 2024-07-10 | End: 2024-07-12 | Stop reason: HOSPADM

## 2024-07-09 RX ADMIN — ATORVASTATIN CALCIUM 80 MG: 40 TABLET, FILM COATED ORAL at 20:49

## 2024-07-09 RX ADMIN — TRAMADOL HYDROCHLORIDE 25 MG: 50 TABLET ORAL at 05:43

## 2024-07-09 RX ADMIN — SODIUM CHLORIDE, PRESERVATIVE FREE 10 ML: 5 INJECTION INTRAVENOUS at 08:55

## 2024-07-09 RX ADMIN — SODIUM CHLORIDE, PRESERVATIVE FREE 10 ML: 5 INJECTION INTRAVENOUS at 20:50

## 2024-07-09 RX ADMIN — TRAMADOL HYDROCHLORIDE 25 MG: 50 TABLET ORAL at 12:11

## 2024-07-09 RX ADMIN — CLOPIDOGREL BISULFATE 75 MG: 75 TABLET ORAL at 08:55

## 2024-07-09 RX ADMIN — Medication 2 PUFF: at 08:11

## 2024-07-09 RX ADMIN — ASPIRIN 81 MG 81 MG: 81 TABLET ORAL at 08:55

## 2024-07-09 ASSESSMENT — PAIN DESCRIPTION - LOCATION
LOCATION: LEG
LOCATION: LEG;KNEE

## 2024-07-09 ASSESSMENT — PAIN SCALES - GENERAL
PAINLEVEL_OUTOF10: 0
PAINLEVEL_OUTOF10: 0
PAINLEVEL_OUTOF10: 5
PAINLEVEL_OUTOF10: 0
PAINLEVEL_OUTOF10: 2
PAINLEVEL_OUTOF10: 1
PAINLEVEL_OUTOF10: 6

## 2024-07-09 ASSESSMENT — PAIN SCALES - WONG BAKER: WONGBAKER_NUMERICALRESPONSE: NO HURT

## 2024-07-09 ASSESSMENT — PAIN DESCRIPTION - ORIENTATION
ORIENTATION: LEFT
ORIENTATION: RIGHT

## 2024-07-09 NOTE — DISCHARGE SUMMARY
Discharge Summary    Name: Chance Blue Sr.  [id number]  YOB: 1934 (Age: 89 y.o.)   Date of Admission: 7/6/2024  Date of Discharge: 7/9/2024  Attending Physician: Noman Lees MD    Discharge Diagnosis:   Principal Problem (Resolved):    TIA (transient ischemic attack)  Active Problems:    Acute CVA (cerebrovascular accident) (HCC)    Ataxia    HTN (hypertension)       Consultations:  IP CONSULT TO TELE-NEUROLOGY  IP CONSULT TO TELE-NEUROLOGY  IP CONSULT TO CARDIOLOGY  IP CONSULT TO CASE MANAGEMENT    Brief Hospital Course by Main Problems:   89 y.o.  male with no significant PMH as patient currently on any medications presents and brought by family for ataxia and difficulty walking and mild dizziness when he woke up at 10 AM. States that he was out in the sun for several hours yesterday planting several tomato trees. He states that when he got up around 10 AM he was slightly weak with mild dizziness and difficulty walking. In the ED, patient is slightly hypertensive with blood pressure of 153/79, not hypoxic, afebrile otherwise normal pulse and respiratory rate. 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 CT brain perfusion was negative for any suggestion for acute infarct noted.  MRI revealed small acute infarction posterior left corona radiata.  Echo revealed EF 65 to 70%, diastolic dysfunction.  A1c 5.7%, lipid panel within normal limits.  Teleneurology recommended high-dose statin, cardiology consult, DAPT ASA 81 mg, Plavix 75 mg, for 21 days.  PT/OT recommended IRF.  Cardiology recommended patient will need outpatient cardiac monitoring with Holter/MCOT, no PFO on TTE, consider outpatient sleep study.  Referral placed for pulmonology for sleep study.  Patient with small amount of blood in urine, will have patient follow-up with urology in office next week to discuss.  Will need CBC and BMP

## 2024-07-09 NOTE — CARE COORDINATION
CM reviewed chart. CM spoke to [facility] liaison ext. [phone], [facility] currently working on IRF. DCP awaiting [facility] guidance for IRF placement. CM will continue to follow.

## 2024-07-09 NOTE — PLAN OF CARE
PHYSICAL THERAPY TREATMENT     Patient: Chance Blue  (89 y.o. male)  Date: 7/9/2024  Diagnosis: TIA (transient ischemic attack) [G45.9]  Dizziness [R42]  Weakness of left lower extremity [R29.898]  Cerebrovascular accident (CVA), unspecified mechanism (HCC) [I63.9]  Acute CVA (cerebrovascular accident) (HCC) [I63.9] TIA (transient ischemic attack)      Precautions: General Precautions, Bed Alarm, Fall Risk                      Recommendations for nursing mobility: Out of bed to chair for meals, AD and gt belt for bed to chair , Amb to bathroom with AD and gait belt, and Assist x1    In place during session: None  Chart, physical therapy assessment, plan of care and goals were reviewed.  ASSESSMENT  Patient continues with skilled PT services and is progressing towards goals. Pt semi-supine upon PT arrival, agreeable to session. Pt A&O x 4. (See below for objective details and assist levels).     Overall pt tolerated session well today with no reports of pain.  Pt requiring CGA and additional time for bed mobility and min A for sit to stand tranfers with cues for hand placement.  Pt progressed ambulation distance today to 60ft with RW and min A.  Pt amb with slow vinita, decreased step length, step to gait pattern, narrow DIANELYS and slight foot drop on R requiring cues to correct.  Pt negotiates turns well and takes his time moving the walker. Pt fatigued following ambulation session, but did participate with LE exercises and was given a written copy of HEP.  Pt verbalized understanding.   Will continue to benefit from skilled PT services, and will continue to progress as tolerated. Potential barriers for safe discharge: pt is a high fall risk and concern for pt safely navigating or managing the home environment. Current PT DC recommendation Inpatient Rehabilitation Facility  once medically appropriate.     Start of Session End of Session   SPO2 (%) 96 96   Heart Rate (BPM) 74 74     GOALS:    Problem:  Physical Therapy - Adult  Goal: By Discharge: Performs mobility at highest level of function for planned discharge setting.  See evaluation for individualized goals.  Description: FUNCTIONAL STATUS PRIOR TO ADMISSION: Patient was independent and active without use of DME. and The patient  was independent for basic and instrumental ADLs.    HOME SUPPORT PRIOR TO ADMISSION: The patient lived with his wife Tonya but did not require assistance.    Physical Therapy Goals  Initiated 7/7/2024  Pt stated goal: to get back to normal  Pt will be I with LE HEP in 7 days.  Pt will perform bed mobility with Modified [place] in 7 days.  Pt will perform transfers with Contact Guard Assist in 7 days.   Pt will amb 150 feet with LRAD safely with Contact Guard Assist in 7 days.  Pt will ascend/descend 1 steps with 0 handrail(s) and Contact Guard Assist in 7 days to safely enter/navigate home.   Pt will demonstrate improvement in dynamic sitting balance from poor to good in 7 days.    Outcome: Progressing          PLAN :  Patient continues to benefit from skilled intervention to address functional impairments. Continue treatment per established plan of care to address goals.    Recommendation for discharge: (in order for the patient to meet his/her long term goals)  Inpatient Rehabilitation Facility     IF patient discharges home will need the following DME:none     SUBJECTIVE:   Patient stated “I just had a good nap.”    OBJECTIVE DATA SUMMARY:   Critical Behavior:  Orientation  Overall Orientation Status: Within Normal Limits  Orientation Level: Oriented X4  Cognition  Overall Cognitive Status: Exceptions  Sequencing: Requires cues for some    Functional Mobility Training:  Bed Mobility:  Bed Mobility Training  Bed Mobility Training: Yes  Interventions: Safety awareness training;Tactile cues;Verbal cues  Rolling: Stand-by assistance;Additional time  Supine to Sit: Contact-guard assistance;Additional time  Scooting:

## 2024-07-09 NOTE — PROGRESS NOTES
CARDIOLOGY PROGRESS NOTE      Patient Name: Chance Blue Sr.  Age: 89 y.o.  Gender:male  :1934  MRN: [id number]    Patient seen and examined. This is a patient with no significant past medical history who presented with difficulty walking and dizziness now being followed for thromboembolic CVA. Sitting upright in bed eating tomatoes. Wife at bedside. Continues with right sided weakness. Denies chest pain. Breathing stable. No other complaints reported.    Telemetry reviewed, there were no events noted in the past 24 hours.    Pertinent review of systems items noted above, all other systems are negative. Current medications reviewed.    Physical Examination    Allergies   Allergen Reactions    Penicillins Diarrhea     Vitals:    24 1211   BP:    Pulse:    Resp: 17   Temp:    SpO2:      Vital signs are stable  No apparent distress.  Heart has a RRR.  No murmur  Lungs are clear  Abdomen is soft, nontender, normal bowel sounds.  Extremities have no edema  Skin is dry and warm.  Normal affect    Labs reviewed:  Recent Results (from the past 12 hour(s))   CBC with Auto Differential    Collection Time: 24  5:20 AM   Result Value Ref Range    WBC 8.2 4.1 - 11.1 K/uL    RBC 4.38 4.10 - 5.70 M/uL    Hemoglobin 13.8 12.1 - 17.0 g/dL    Hematocrit 40.6 36.6 - 50.3 %    MCV 92.7 80.0 - 99.0 FL    MCH 31.5 26.0 - 34.0 PG    MCHC 34.0 30.0 - 36.5 g/dL    RDW 13.7 11.5 - 14.5 %    Platelets 203 150 - 400 K/uL    MPV 10.1 8.9 - 12.9 FL    Nucleated RBCs 0.0 0.0  WBC    nRBC 0.00 0.00 - 0.01 K/uL    Neutrophils % 75 32 - 75 %    Lymphocytes % 11 (L) 12 - 49 %    Monocytes % 10 5 - 13 %    Eosinophils % 4 0 - 7 %    Basophils % 0 0 - 1 %    Immature Granulocytes % 0 0 - 0.5 %    Neutrophils Absolute 6.1 1.8 - 8.0 K/UL    Lymphocytes Absolute 0.9 0.8 - 3.5 K/UL    Monocytes Absolute 0.8 0.0 - 1.0 K/UL    Eosinophils Absolute 0.3 0.0 - 0.4 K/UL    Basophils Absolute 0.0 0.0 - 0.1 K/UL    Immature

## 2024-07-09 NOTE — PROGRESS NOTES
Comprehensive Nutrition Assessment    Type and Reason for Visit:  Initial, Positive Nutrition Screen (MST 2)    Nutrition Recommendations/Plan:   Add Ensure TID with meals  Monitor po intake and weight     Malnutrition Assessment:  Malnutrition Status:  Moderate malnutrition (07/09/24 1325)    Context:  Chronic Illness     Findings of the 6 clinical characteristics of malnutrition:  Energy Intake:  75% or less estimated energy requirements for 1 month or longer  Weight Loss:  No significant weight loss     Body Fat Loss:  Mild body fat loss Triceps, Buccal region   Muscle Mass Loss:  Mild muscle mass loss Temples (temporalis), Clavicles (pectoralis & deltoids), Hand (interosseous)  Fluid Accumulation:  No significant fluid accumulation     Strength:  Not Performed    Nutrition Assessment:    Patient admitted with CVA, patient recieves a Regular diet is fair at ~50%, reports eating well at home, however also endorses only eating 2 times daily, and not eating as much as in past. Patient also states that UBW is 147#, current weight is down 8#, not significant, however current weight below acceptable BMI. Patient agreeable to ONS. Family present during visit, very supportive of ONS use here and at home. Meds adn labs reviewed.    Nutrition Related Findings:    NFPE completed. No chewing or swallowing difficulties. No N/V/D Wound Type: None       Current Nutrition Intake & Therapies:    Average Meal Intake: 26-50%, 51-75%  Average Supplements Intake: None Ordered  ADULT DIET; Regular    Anthropometric Measures:  Height: 165.1 cm (5' 5\")  Ideal Body Weight (IBW): 136 lbs (62 kg)    Admission Body Weight: 63 kg (138 lb 14.2 oz)  Current Body Weight: 63 kg (138 lb 14.2 oz), 102.1 % IBW. Weight Source: Stated  Current BMI (kg/m2): 23.1  Usual Body Weight: 67 kg (147 lb 11.3 oz)  % Weight Change (Calculated): -6                    BMI Categories: Normal Weight (BMI 22.0 to 24.9) age over 65    Estimated Daily Nutrient  Needs:  Energy Requirements Based On: Kcal/kg  Weight Used for Energy Requirements: Current  Energy (kcal/day): 2200 kcals (35kcals/kg)  Weight Used for Protein Requirements: Current  Protein (g/day): 75 gr (1.2gr/kg)  Method Used for Fluid Requirements: 1 ml/kcal  Fluid (ml/day): 2200 ml (1ml/kcal)    Nutrition Diagnosis:   Moderate malnutrition, In context of chronic illness related to inadequate protein-energy intake as evidenced by poor intake prior to admission, mild loss of subcutaneous fat, mild muscle loss    Nutrition Interventions:   Food and/or Nutrient Delivery: Continue Current Diet, Start Oral Nutrition Supplement  Nutrition Education/Counseling: No recommendation at this time  Coordination of Nutrition Care: Continue to monitor while inpatient       Goals:     Goals: Meet at least 75% of estimated needs, by next RD assessment       Nutrition Monitoring and Evaluation:   Behavioral-Environmental Outcomes: None Identified  Food/Nutrient Intake Outcomes: Food and Nutrient Intake, Supplement Intake  Physical Signs/Symptoms Outcomes: Weight    Discharge Planning:    Continue current diet, Continue Oral Nutrition Supplement     Yue Rush RD  Contact: Ext: [phone] or [place]

## 2024-07-10 PROCEDURE — 6370000000 HC RX 637 (ALT 250 FOR IP): Performed by: INTERNAL MEDICINE

## 2024-07-10 PROCEDURE — 97116 GAIT TRAINING THERAPY: CPT

## 2024-07-10 PROCEDURE — 1100000000 HC RM PRIVATE

## 2024-07-10 PROCEDURE — 97530 THERAPEUTIC ACTIVITIES: CPT

## 2024-07-10 PROCEDURE — 2580000003 HC RX 258: Performed by: INTERNAL MEDICINE

## 2024-07-10 PROCEDURE — 94640 AIRWAY INHALATION TREATMENT: CPT

## 2024-07-10 PROCEDURE — 94761 N-INVAS EAR/PLS OXIMETRY MLT: CPT

## 2024-07-10 PROCEDURE — 6370000000 HC RX 637 (ALT 250 FOR IP): Performed by: PSYCHIATRY & NEUROLOGY

## 2024-07-10 PROCEDURE — 6370000000 HC RX 637 (ALT 250 FOR IP): Performed by: PHYSICIAN ASSISTANT

## 2024-07-10 RX ADMIN — TRAMADOL HYDROCHLORIDE 25 MG: 50 TABLET ORAL at 08:50

## 2024-07-10 RX ADMIN — Medication 2 PUFF: at 07:29

## 2024-07-10 RX ADMIN — SODIUM CHLORIDE: 9 INJECTION, SOLUTION INTRAVENOUS at 01:47

## 2024-07-10 RX ADMIN — ASPIRIN 81 MG 81 MG: 81 TABLET ORAL at 08:50

## 2024-07-10 RX ADMIN — ATORVASTATIN CALCIUM 80 MG: 40 TABLET, FILM COATED ORAL at 19:44

## 2024-07-10 RX ADMIN — CLOPIDOGREL BISULFATE 75 MG: 75 TABLET ORAL at 08:50

## 2024-07-10 RX ADMIN — LISINOPRIL 5 MG: 10 TABLET ORAL at 08:50

## 2024-07-10 RX ADMIN — SODIUM CHLORIDE, PRESERVATIVE FREE 10 ML: 5 INJECTION INTRAVENOUS at 09:26

## 2024-07-10 RX ADMIN — ACETAMINOPHEN 650 MG: 325 TABLET ORAL at 19:44

## 2024-07-10 ASSESSMENT — PAIN SCALES - GENERAL
PAINLEVEL_OUTOF10: 6
PAINLEVEL_OUTOF10: 4
PAINLEVEL_OUTOF10: 7

## 2024-07-10 ASSESSMENT — PAIN DESCRIPTION - ORIENTATION
ORIENTATION: RIGHT
ORIENTATION: RIGHT

## 2024-07-10 ASSESSMENT — PAIN DESCRIPTION - DESCRIPTORS: DESCRIPTORS: DISCOMFORT

## 2024-07-10 ASSESSMENT — PAIN SCALES - WONG BAKER: WONGBAKER_NUMERICALRESPONSE: NO HURT

## 2024-07-10 ASSESSMENT — PAIN DESCRIPTION - LOCATION
LOCATION: LEG
LOCATION: LEG

## 2024-07-10 NOTE — PLAN OF CARE
Therapeutic Exercises:   Reviewed exercises but told to defer until this afternoon due to just finishing with [facility] and increased soreness from yesterday's treatment.    EXERCISE   Sets   Reps   Active Active Assist   Passive Self ROM   Comments   Ankle Pumps   [] [] [] []    Quad Sets/Glut Sets   [] [] [] []    Hamstring Sets   [] [] [] []    Short Arc Quads   [] [] [] []    Heel Slides   [] [] [] []    Straight Leg Raises   [] [] [] []    Hip abd/add   [] [] [] []    Long Arc Quads   [] [] [] []    Marching   [] [] [] []    Seated HR/TR   [] [] [] []       [] [] [] []       Pain Ratin/10 R leg reported  Pain Intervention(s):       Activity Tolerance:   Good and requires rest breaks    After treatment patient left in no apparent distress:   Bed locked and returned to lowest position, Patient left in no apparent distress in bed, Call bell within reach, and Caregiver / family present, and nsg updated     COMMUNICATION/COLLABORATION:   The patient’s plan of care was discussed with: Occupational therapy assistant and Registered nurse    Patient Education  Education Given To: Patient  Education Provided: Role of Therapy;Plan of Care;Fall Prevention Strategies;Transfer Training;Home Exercise Program  Education Method: Verbal;Teach Back;Demonstration  Barriers to Learning: None  Education Outcome: Demonstrated understanding;Verbalized understanding;Continued education needed        Leonela Sims PT  Minutes: 25

## 2024-07-10 NOTE — PLAN OF CARE
Problem: Safety - Adult  Goal: Free from fall injury  Outcome: Progressing     Problem: Physical Therapy - Adult  Goal: By Discharge: Performs mobility at highest level of function for planned discharge setting.  See evaluation for individualized goals.  Description: FUNCTIONAL STATUS PRIOR TO ADMISSION: Patient was independent and active without use of DME. and The patient  was independent for basic and instrumental ADLs.    HOME SUPPORT PRIOR TO ADMISSION: The patient lived with his wife Tonya but did not require assistance.    Physical Therapy Goals  Initiated 7/7/2024  Pt stated goal: to get back to normal  Pt will be I with LE HEP in 7 days.  Pt will perform bed mobility with Modified [place] in 7 days.  Pt will perform transfers with Contact Guard Assist in 7 days.   Pt will amb 150 feet with LRAD safely with Contact Guard Assist in 7 days.  Pt will ascend/descend 1 steps with 0 handrail(s) and Contact Guard Assist in 7 days to safely enter/navigate home.   Pt will demonstrate improvement in dynamic sitting balance from poor to good in 7 days.    7/9/2024 1620 by Leonela Sims, PT  Outcome: Progressing     Problem: Skin/Tissue Integrity  Goal: Absence of new skin breakdown  Description: 1.  Monitor for areas of redness and/or skin breakdown  2.  Assess vascular access sites hourly  3.  Every 4-6 hours minimum:  Change oxygen saturation probe site  4.  Every 4-6 hours:  If on nasal continuous positive airway pressure, respiratory therapy assess nares and determine need for appliance change or resting period.  Outcome: Progressing     Problem: ABCDS Injury Assessment  Goal: Absence of physical injury  Outcome: Progressing

## 2024-07-10 NOTE — PROGRESS NOTES
Physician Progress Note      PATIENT:               ELISSA PAREDES  CSN #:                  [id number]  :                       1934  ADMIT DATE:       2024 3:16 PM  DISCH DATE:  RESPONDING  PROVIDER #:        Asael Boogie PA-C          QUERY TEXT:    Patient admitted with CVA. Noted to have dietician assessment with   malnutrition diagnosis in  note. If possible, please document in progress   notes and discharge summary if you are evaluating and /or treating any of the   following:    The medical record reflects the following:  Risk Factors: 88 yo male with CVA, HTN, ataxia  Clinical Indicators:  Nutritional Consult:  Moderate malnutrition- Mild body   fat loss Triceps, Buccal region - Mild muscle mass loss Temples (temporalis),   Clavicles (pectoralis & deltoids), Hand (interosseous)  Treatment: Add Ensure TID with meals    Thank you,  Alicja Obrien RN, CCDS    ASPEN Criteria:    https://aspenjournals.onlinelibrary.coates.com/doi/full/10.1177/330291050441283  5  Options provided:  -- Protein calorie malnutrition moderate  -- Other - I will add my own diagnosis  -- Disagree - Not applicable / Not valid  -- Disagree - Clinically unable to determine / Unknown  -- Refer to Clinical Documentation Reviewer    PROVIDER RESPONSE TEXT:    This patient has moderate protein calorie malnutrition.    Query created by: Alicja Obrien on 7/10/2024 11:19 AM      Electronically signed by:  Asael Boogie PA-C 7/10/2024 1:32   PM

## 2024-07-10 NOTE — CARE COORDINATION
1433: CM spoke with patient and spouse they do not want to go the [place], but [facility] if they have beds. Patient provided Medicare card today and asked if [facility] accept her Medicare insurance. Ins Card faxed to registration.     [facility] accepted pending insurance verification and auth    0840: CM reviewed chart. [facility] working on IRF placement when medicallly ready. Updated OT note need. CM will continue to follow.

## 2024-07-10 NOTE — PROGRESS NOTES
OCCUPATIONAL THERAPY TREATMENT  Patient: Chance Blue . (89 y.o. male)  Date: 7/10/2024  Primary Diagnosis: TIA (transient ischemic attack) [G45.9]  Dizziness [R42]  Weakness of left lower extremity [R29.898]  Cerebrovascular accident (CVA), unspecified mechanism (HCC) [I63.9]  Acute CVA (cerebrovascular accident) (HCC) [I63.9]       Precautions: General Precautions, Bed Alarm, Fall Risk                Recommendations for nursing mobility: Out of bed to chair for meals, Encourage HEP in prep for ADLs/mobility; see handout for details, Use of bed/chair alarm for safety, Use of BSC for toileting , AD and gt belt for bed to chair , and Assist x1    In place during session: Peripheral IV and EKG/telemetry   Chart, occupational therapy assessment, plan of care, and goals were reviewed.  ASSESSMENT  Patient continues with skilled OT services and is progressing towards goals. Pt presented seated EOB upon OT arrival, agreeable to session. Pt A&O x 4. PT instructed on donning R UE & LE first and doffing last.  Pt was unable to flex forward to doff/don slipper socks or briefs.  Therapist attempted to have pt scoot R hip back in bed to bring R LE up, however, pt c/o 8/10 pain in R LE and was unable to tolerate.  Pt Instructed on doffing/donning hospital gown the \"usual way\" and donning/doffing like donning a jacket.  Pt required step by step verbal cues, verbal cues for redirection secondary very distractible with family in room. Patient and/or family was verbally educated on the BE FAST acronym for signs/symptoms of CVA and TIA. BE FAST was written on patient's communication board  for visual education and reinforcement.  All questions answered with patient indicating fair understanding. Pt proved with handouts on BE FAST and stroke. Pt participated in Neuro Re-education R UE.  Pt limited with R UE secondary painful IV in anterior elbow.  Pt left seated EOB with PT in room. (See below for objective details and assist

## 2024-07-10 NOTE — PROGRESS NOTES
CARDIOLOGY PROGRESS NOTE      Patient Name: Chance Blue Sr.  Age: 89 y.o.  Gender:male  :1934  MRN: [id number]    Patient seen and examined. This is a patient with no significant past medical history who presented with difficulty walking and dizziness now being followed for thromboembolic CVA. Sitting upright in bed eating tomatoes. Wife at bedside. Continues with right sided weakness. Denies chest pain. Breathing stable. No other complaints reported.    Telemetry reviewed.    Pertinent review of systems items noted above, all other systems are negative. Current medications reviewed.    Physical Examination    Allergies   Allergen Reactions    Penicillins Diarrhea     Vitals:    07/10/24 1456   BP: 101/62   Pulse: 71   Resp: 18   Temp: 97.5 °F (36.4 °C)   SpO2: 97%     Vital signs are stable  No apparent distress.  Heart has a RRR.  No murmur  Lungs are clear  Abdomen is soft, nontender, normal bowel sounds.  Extremities have no edema  Skin is dry and warm.  Normal affect    Labs reviewed:  No results found for this or any previous visit (from the past 12 hour(s)).       Case discussed with Dr. Cruz and our impression and recommendations are as follows:  Acute small left posterior infarct,   Continue telemetry, no arrhythmias seen thus far   Echo with EF 65-70%, NWM, negative bubble study   Will need outpatient follow-up for further cardiac monitoring with holter/MCOT  Continue ASA, plavix, statin   Bradycardia, overnight while sleeping rates dropped to 30s. EKG today with NSR with 1st degree AV block. Asymptomatic. Consider OP sleep study. Plan for OP monitor as above    Hypertension, BP stable. Continue current regimen     Stable for discharge from cardiac standpoint. Will need outpatient follow-up within the next two weeks.     Please do not hesitate to call if additional questions arise.    CORINNE HERRON, ELIGIO - NP  7/10/2024

## 2024-07-10 NOTE — PROGRESS NOTES
4 Eyes Skin Assessment     NAME:  Chance Blue Sr.  YOB: 1934  MEDICAL RECORD NUMBER:  [id number]    The patient is being assessed for  Other Weekly    I agree that at least one RN has performed a thorough Head to Toe Skin Assessment on the patient. ALL assessment sites listed below have been assessed.      Areas assessed by both nurses:    Head, Face, Ears, Shoulders, Back, Chest, Arms, Elbows, Hands, Sacrum. Buttock, Coccyx, Ischium, Legs. Feet and Heels, and Under Medical Devices         Does the Patient have a Wound? No noted wound(s)       Chase Prevention initiated by RN: No  Wound Care Orders initiated by RN: No    Pressure Injury (Stage 3,4, Unstageable, DTI, NWPT, and Complex wounds) if present, place Wound referral order by RN under : No    New Ostomies, if present place, Ostomy referral order under : No     Nurse 1 eSignature: Electronically signed by BREANNA LAW RN on 7/10/24 at 12:03 PM EDT    **SHARE this note so that the co-signing nurse can place an eSignature**    Nurse 2 eSignature: Electronically signed by Nicole Guido RN on 7/10/24 at 2:36 PM EDT

## 2024-07-10 NOTE — DISCHARGE SUMMARY
Discharge Summary    Name: Chance Blue Sr.  [id number]  YOB: 1934 (Age: 89 y.o.)   Date of Admission: 7/6/2024  Date of Discharge: 7/10/2024  Attending Physician: Noman Lees MD    Discharge Diagnosis:   Principal Problem (Resolved):    TIA (transient ischemic attack)  Active Problems:    Acute CVA (cerebrovascular accident) (HCC)    Ataxia    HTN (hypertension)       Consultations:  IP CONSULT TO TELE-NEUROLOGY  IP CONSULT TO TELE-NEUROLOGY  IP CONSULT TO CARDIOLOGY  IP CONSULT TO CASE MANAGEMENT    Brief Hospital Course by Main Problems:   89 y.o.  male with no significant PMH as patient currently on any medications presents and brought by family for ataxia and difficulty walking and mild dizziness when he woke up at 10 AM. States that he was out in the sun for several hours yesterday planting several tomato trees. He states that when he got up around 10 AM he was slightly weak with mild dizziness and difficulty walking. In the ED, patient is slightly hypertensive with blood pressure of 153/79, not hypoxic, afebrile otherwise normal pulse and respiratory rate. 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 CT brain perfusion was negative for any suggestion for acute infarct noted.  MRI revealed small acute infarction posterior left corona radiata.  Echo revealed EF 65 to 70%, diastolic dysfunction.  A1c 5.7%, lipid panel within normal limits.  Teleneurology recommended high-dose statin, cardiology consult, DAPT ASA 81 mg, Plavix 75 mg, for 21 days.  PT/OT recommended IRF.  Cardiology recommended patient will need outpatient cardiac monitoring with Holter/MCOT, no PFO on TTE, consider outpatient sleep study.  Referral placed for pulmonology for sleep study.  Patient with small amount of blood in urine, will have patient follow-up with urology in office next week to discuss.  Will need CBC and BMP    Psychiatric:         Mood and Affect: Mood normal.         Behavior: Behavior normal.         Discharge/Recent Laboratory Results:  Recent Labs     07/09/24  0520      K 3.5   *   CO2 22   BUN 8   CREATININE 0.45*   GLUCOSE 99   CALCIUM 8.6     Recent Labs     07/09/24  0520   HGB 13.8   HCT 40.6   WBC 8.2          Discharge Medications:     Medication List        START taking these medications      aspirin 81 MG chewable tablet  Take 1 tablet by mouth daily     atorvastatin 80 MG tablet  Commonly known as: LIPITOR  Take 1 tablet by mouth nightly     clopidogrel 75 MG tablet  Commonly known as: PLAVIX  Take 1 tablet by mouth daily for 18 doses     lidocaine 4 % external patch  Place 1 patch onto the skin daily for 5 doses     lisinopril 5 MG tablet  Commonly known as: PRINIVIL;ZESTRIL  Take 1 tablet by mouth daily               Where to Get Your Medications        These medications were sent to [facility] #[id number] - [place], [place] - [address] - P [phone] - F [phone] [address] [facility] [address]      Phone: [phone]   aspirin 81 MG chewable tablet  atorvastatin 80 MG tablet  clopidogrel 75 MG tablet  lidocaine 4 % external patch  lisinopril 5 MG tablet             DISPOSITION:    Home with Family:       Home with HH/PT/OT/RN:    SNF/LTC: x   SIRI:    OTHER:            Code status: FULL  Recommended diet: cardiac diet  Recommended activity: activity as tolerated  Wound care: None      Follow up with:   PCP : No, Pcp  Paul Walker MD  [address] [facility] [address] [phone]    Follow up in 1 week(s)  New patient referral for PCP care.  Patient recently admitted to hospital and found to have CVA.    Emergency Department  [address] [facility] [address]  Follow up  If symptoms worsen, As needed    Brando Cruz MD  [facility] [address]  [facility] [address] [phone]    Follow up in 2

## 2024-07-11 PROCEDURE — 94640 AIRWAY INHALATION TREATMENT: CPT

## 2024-07-11 PROCEDURE — 94761 N-INVAS EAR/PLS OXIMETRY MLT: CPT

## 2024-07-11 PROCEDURE — 6370000000 HC RX 637 (ALT 250 FOR IP): Performed by: INTERNAL MEDICINE

## 2024-07-11 PROCEDURE — 97530 THERAPEUTIC ACTIVITIES: CPT

## 2024-07-11 PROCEDURE — 6370000000 HC RX 637 (ALT 250 FOR IP): Performed by: PHYSICIAN ASSISTANT

## 2024-07-11 PROCEDURE — 1100000000 HC RM PRIVATE

## 2024-07-11 PROCEDURE — 6370000000 HC RX 637 (ALT 250 FOR IP): Performed by: PSYCHIATRY & NEUROLOGY

## 2024-07-11 RX ORDER — PANTOPRAZOLE SODIUM 40 MG/1
40 TABLET, DELAYED RELEASE ORAL
Status: DISCONTINUED | OUTPATIENT
Start: 2024-07-12 | End: 2024-07-12 | Stop reason: HOSPADM

## 2024-07-11 RX ORDER — CALCIUM CARBONATE 500 MG/1
500 TABLET, CHEWABLE ORAL 3 TIMES DAILY PRN
Status: DISCONTINUED | OUTPATIENT
Start: 2024-07-11 | End: 2024-07-12 | Stop reason: HOSPADM

## 2024-07-11 RX ORDER — PANTOPRAZOLE SODIUM 40 MG/10ML
40 INJECTION, POWDER, LYOPHILIZED, FOR SOLUTION INTRAVENOUS DAILY
Status: DISCONTINUED | OUTPATIENT
Start: 2024-07-11 | End: 2024-07-11

## 2024-07-11 RX ADMIN — TRAMADOL HYDROCHLORIDE 25 MG: 50 TABLET ORAL at 15:33

## 2024-07-11 RX ADMIN — CALCIUM CARBONATE 500 MG: 500 TABLET, CHEWABLE ORAL at 17:25

## 2024-07-11 RX ADMIN — ASPIRIN 81 MG 81 MG: 81 TABLET ORAL at 09:05

## 2024-07-11 RX ADMIN — Medication 2 PUFF: at 08:18

## 2024-07-11 RX ADMIN — ATORVASTATIN CALCIUM 80 MG: 40 TABLET, FILM COATED ORAL at 21:21

## 2024-07-11 RX ADMIN — ACETAMINOPHEN 650 MG: 325 TABLET ORAL at 07:47

## 2024-07-11 RX ADMIN — CLOPIDOGREL BISULFATE 75 MG: 75 TABLET ORAL at 09:04

## 2024-07-11 RX ADMIN — LISINOPRIL 5 MG: 10 TABLET ORAL at 09:04

## 2024-07-11 ASSESSMENT — PAIN DESCRIPTION - DESCRIPTORS: DESCRIPTORS: CRAMPING

## 2024-07-11 ASSESSMENT — PAIN SCALES - GENERAL
PAINLEVEL_OUTOF10: 2
PAINLEVEL_OUTOF10: 6
PAINLEVEL_OUTOF10: 5
PAINLEVEL_OUTOF10: 5
PAINLEVEL_OUTOF10: 0

## 2024-07-11 ASSESSMENT — PAIN DESCRIPTION - LOCATION
LOCATION: LEG
LOCATION: LEG

## 2024-07-11 ASSESSMENT — PAIN DESCRIPTION - ORIENTATION
ORIENTATION: RIGHT
ORIENTATION: RIGHT

## 2024-07-11 ASSESSMENT — PAIN SCALES - WONG BAKER
WONGBAKER_NUMERICALRESPONSE: HURTS A LITTLE BIT
WONGBAKER_NUMERICALRESPONSE: HURTS LITTLE MORE

## 2024-07-11 NOTE — CARE COORDINATION
1058 CM noted discharge order.     Patient is pending auth with [facility]. CM has reached out to facility for update and they state that the auth process was started today.    CM will continue to follow.

## 2024-07-11 NOTE — DISCHARGE SUMMARY
Discharge Summary    Name: Chance Blue Sr.  [id number]  YOB: 1934 (Age: 89 y.o.)   Date of Admission: 7/6/2024  Date of Discharge: 7/11/2024  Attending Physician: Noman Lees MD    Discharge Diagnosis:   Principal Problem (Resolved):    TIA (transient ischemic attack)  Active Problems:    Acute CVA (cerebrovascular accident) (HCC)    Ataxia    HTN (hypertension)       Consultations:  IP CONSULT TO TELE-NEUROLOGY  IP CONSULT TO TELE-NEUROLOGY  IP CONSULT TO CARDIOLOGY  IP CONSULT TO CASE MANAGEMENT    Brief Hospital Course by Main Problems:   89 y.o.  male with no significant PMH as patient currently on any medications presents and brought by family for ataxia and difficulty walking and mild dizziness when he woke up at 10 AM. States that he was out in the sun for several hours yesterday planting several tomato trees. He states that when he got up around 10 AM he was slightly weak with mild dizziness and difficulty walking. In the ED, patient is slightly hypertensive with blood pressure of 153/79, not hypoxic, afebrile otherwise normal pulse and respiratory rate. 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 CT brain perfusion was negative for any suggestion for acute infarct noted.  MRI revealed small acute infarction posterior left corona radiata.  Echo revealed EF 65 to 70%, diastolic dysfunction.  A1c 5.7%, lipid panel within normal limits.  Teleneurology recommended high-dose statin, cardiology consult, DAPT ASA 81 mg, Plavix 75 mg, for 21 days.  PT/OT recommended IRF.  Cardiology recommended patient will need outpatient cardiac monitoring with Holter/MCOT, no PFO on TTE, consider outpatient sleep study.  Referral placed for pulmonology for sleep study.  Patient with small amount of blood in urine, will have patient follow-up with urology in office next week to discuss.  Will need CBC and BMP  07/09/24  0520   HGB 13.8   HCT 40.6   WBC 8.2          Discharge Medications:     Medication List        START taking these medications      aspirin 81 MG chewable tablet  Take 1 tablet by mouth daily     atorvastatin 80 MG tablet  Commonly known as: LIPITOR  Take 1 tablet by mouth nightly     clopidogrel 75 MG tablet  Commonly known as: PLAVIX  Take 1 tablet by mouth daily for 18 doses     lidocaine 4 % external patch  Place 1 patch onto the skin daily for 5 doses     lisinopril 5 MG tablet  Commonly known as: PRINIVIL;ZESTRIL  Take 1 tablet by mouth daily               Where to Get Your Medications        These medications were sent to [email or web address] #[id number] - [place], [place] - [address] - P [phone] - F [phone] [address] [facility] [address]      Phone: [phone]   aspirin 81 MG chewable tablet  atorvastatin 80 MG tablet  clopidogrel 75 MG tablet  lidocaine 4 % external patch  lisinopril 5 MG tablet             DISPOSITION:    Home with Family:       Home with HH/PT/OT/RN:    SNF/LTC: x   SIRI:    OTHER:            Code status: FULL  Recommended diet: cardiac diet  Recommended activity: activity as tolerated  Wound care: None      Follow up with:   PCP : Antoinette, Pcp  Paul Walker MD  [address] [facility] [address] [facility] [address] [phone]    Follow up in 1 week(s)  New patient referral for PCP care.  Patient recently admitted to hospital and found to have CVA.    Emergency Department  [address] [facility] [address]  Follow up  If symptoms worsen, As needed    Brando Cruz MD  [address] [place] [address] [address]  [facility] [address]  [facility] [address] [phone]    Follow up in 2 week(s)  Follow-up for continued outpatient cardiac care and outpatient arrhythmia evaluation    Juancarlos Sorensen MD  [address] [place] [address] [phone]    Follow up in 1 week(s)  New patient referral for outpatient urologic care, patient recently

## 2024-07-11 NOTE — PROGRESS NOTES
OCCUPATIONAL THERAPY TREATMENT  Patient: Chance Blue  (89 y.o. male)  Date: 7/11/2024  Primary Diagnosis: TIA (transient ischemic attack) [G45.9]  Dizziness [R42]  Weakness of left lower extremity [R29.898]  Cerebrovascular accident (CVA), unspecified mechanism (HCC) [I63.9]  Acute CVA (cerebrovascular accident) (HCC) [I63.9]       Precautions: General Precautions, Bed Alarm, Fall Risk                Recommendations for nursing mobility: Out of bed to chair for meals, Encourage HEP in prep for ADLs/mobility; see handout for details, Use of bed/chair alarm for safety, AD and gt belt for bed to chair , Amb to bathroom with AD and gait belt, and Assist x1    In place during session: EKG/telemetry   Chart, occupational therapy assessment, plan of care, and goals were reviewed.  ASSESSMENT  Patient continues with skilled OT services and is progressing towards goals. Pt sitting EOB upon DICKERSON arrival, agreeable to session. Pt A&O x 4. Pt using L UE to eat breakfast.  Therapist gave pt a red foam  to place on utensils.  Pt demonstrated ability to hold in R hand and carry to mouth.  Pt also given green foam block to work on R hand .  Pt participated in neuro re-ed for R UE.   Pt with improvement with ROM and strength in RUE. Pt returned supine in bed with HOB elevated. Pt worked on LB dressing. Pt was able to doff both socks.  Pt required forward chaining getting toes over L foot, and needing assistance to get over heel on R foot.  Pt was left semi supine with call bell and all needs met.  (See below for objective details and assist levels).     Overall pt tolerated session fair today.  Pt getting good neuro recovery in R UE.  Potential barriers for safe discharge: pts current support system is unable to meet their requirements for physical assistance, pt is a high fall risk, pt is not safe to be alone, and concern for pt safely navigating or managing the home environment. Current OT recommendations for  Education  Education Given To: Patient  Education Provided: Plan of Care;ADL Adaptive Strategies;Home Exercise Program  Education Provided Comments: BE FAST Education continued  Education Method: Demonstration;Verbal  Barriers to Learning: Cognition  Education Outcome: Verbalized understanding;Continued education needed;Demonstrated understanding    Thank you for this referral.  SUNITA Mueller  Minutes: 34

## 2024-07-12 VITALS
TEMPERATURE: 97.9 F | HEIGHT: 65 IN | SYSTOLIC BLOOD PRESSURE: 111 MMHG | RESPIRATION RATE: 18 BRPM | DIASTOLIC BLOOD PRESSURE: 58 MMHG | WEIGHT: 139 LBS | OXYGEN SATURATION: 95 % | BODY MASS INDEX: 23.16 KG/M2 | HEART RATE: 69 BPM

## 2024-07-12 PROCEDURE — 97530 THERAPEUTIC ACTIVITIES: CPT

## 2024-07-12 PROCEDURE — 6370000000 HC RX 637 (ALT 250 FOR IP): Performed by: INTERNAL MEDICINE

## 2024-07-12 PROCEDURE — 94761 N-INVAS EAR/PLS OXIMETRY MLT: CPT

## 2024-07-12 PROCEDURE — 6370000000 HC RX 637 (ALT 250 FOR IP): Performed by: PHYSICIAN ASSISTANT

## 2024-07-12 PROCEDURE — 94640 AIRWAY INHALATION TREATMENT: CPT

## 2024-07-12 PROCEDURE — 6370000000 HC RX 637 (ALT 250 FOR IP): Performed by: PSYCHIATRY & NEUROLOGY

## 2024-07-12 RX ORDER — LOPERAMIDE HYDROCHLORIDE 2 MG/1
2 CAPSULE ORAL 4 TIMES DAILY PRN
Status: DISCONTINUED | OUTPATIENT
Start: 2024-07-12 | End: 2024-07-12 | Stop reason: HOSPADM

## 2024-07-12 RX ADMIN — LISINOPRIL 5 MG: 10 TABLET ORAL at 08:53

## 2024-07-12 RX ADMIN — LOPERAMIDE HYDROCHLORIDE 2 MG: 2 CAPSULE ORAL at 14:37

## 2024-07-12 RX ADMIN — Medication 2 PUFF: at 09:04

## 2024-07-12 RX ADMIN — ASPIRIN 81 MG 81 MG: 81 TABLET ORAL at 08:53

## 2024-07-12 RX ADMIN — CLOPIDOGREL BISULFATE 75 MG: 75 TABLET ORAL at 08:53

## 2024-07-12 RX ADMIN — PANTOPRAZOLE SODIUM 40 MG: 40 TABLET, DELAYED RELEASE ORAL at 05:34

## 2024-07-12 RX ADMIN — TRAMADOL HYDROCHLORIDE 25 MG: 50 TABLET ORAL at 02:23

## 2024-07-12 ASSESSMENT — PAIN DESCRIPTION - ORIENTATION: ORIENTATION: RIGHT

## 2024-07-12 ASSESSMENT — PAIN SCALES - GENERAL: PAINLEVEL_OUTOF10: 6

## 2024-07-12 ASSESSMENT — PAIN DESCRIPTION - LOCATION: LOCATION: FOOT

## 2024-07-12 NOTE — PLAN OF CARE
Problem: Discharge Planning  Goal: Discharge to home or other facility with appropriate resources  Outcome: Progressing     Problem: Safety - Adult  Goal: Free from fall injury  Outcome: Progressing     Problem: Occupational Therapy - Adult  Goal: By Discharge: Performs self-care activities at highest level of function for planned discharge setting.  See evaluation for individualized goals.  Description: FUNCTIONAL STATUS PRIOR TO ADMISSION:  Pt lives at home with wife, IND with I/ADLs at baseline and ambulates IND with no AD. Pt gardens and does all yard work.    Occupational Therapy Goals:  Initiated 7/7/2024  1.  Patient will perform grooming with Set-up within 7 day(s).  2.  Patient will perform self-feeding with Set-up within 7 day(s).  3.  Patient will perform upper body dressing with Set-up within 7 day(s).  4.  Patient will perform toilet transfers with Contact Guard Assist  within 7 day(s).  5.  Patient will perform all aspects of toileting with Minimal Assist within 7 day(s).  6.  Patient will participate in upper extremity therapeutic exercise/activities with Supervision within 7 day(s).    7.  Patient will utilize energy conservation techniques during functional activities with verbal cues within 7 day(s).  7/11/2024 1004 by Margo Blank OTA  Outcome: Progressing     Problem: Skin/Tissue Integrity  Goal: Absence of new skin breakdown  Description: 1.  Monitor for areas of redness and/or skin breakdown  2.  Assess vascular access sites hourly  3.  Every 4-6 hours minimum:  Change oxygen saturation probe site  4.  Every 4-6 hours:  If on nasal continuous positive airway pressure, respiratory therapy assess nares and determine need for appliance change or resting period.  Outcome: Progressing     Problem: ABCDS Injury Assessment  Goal: Absence of physical injury  Outcome: Progressing

## 2024-07-12 NOTE — PLAN OF CARE
Problem: Discharge Planning  Goal: Discharge to home or other facility with appropriate resources  7/12/2024 0934 by Cain Arcos RN  Outcome: Progressing  7/11/2024 2247 by Louisa Delvalle RN  Outcome: Progressing     Problem: Safety - Adult  Goal: Free from fall injury  7/12/2024 0934 by Cain Arcos RN  Outcome: Progressing  7/11/2024 2247 by Louisa Delvalle RN  Outcome: Progressing     Problem: Skin/Tissue Integrity  Goal: Absence of new skin breakdown  Description: 1.  Monitor for areas of redness and/or skin breakdown  2.  Assess vascular access sites hourly  3.  Every 4-6 hours minimum:  Change oxygen saturation probe site  4.  Every 4-6 hours:  If on nasal continuous positive airway pressure, respiratory therapy assess nares and determine need for appliance change or resting period.  7/12/2024 0934 by Cain Arcos RN  Outcome: Progressing  7/11/2024 2247 by Louisa Delvalle RN  Outcome: Progressing     Problem: ABCDS Injury Assessment  Goal: Absence of physical injury  7/12/2024 0934 by Cain Arcos RN  Outcome: Progressing  7/11/2024 2247 by Louisa Delvalle RN  Outcome: Progressing

## 2024-07-12 NOTE — DISCHARGE INSTR - COC
Continuity of Care Form    Patient Name: Chance Blue Sr.   :  1934  MRN:  [id number]    Admit date:  2024  Discharge date:  2024    Code Status Order: Full Code   Advance Directives:     Admitting Physician:  Frederick Yusuf MD  PCP: Antoinette, Pcp    Discharging Nurse: Cain Arcos  Discharging Hospital Unit/Room#: 569/01  Discharging Unit Phone Number:     Emergency Contact:   Extended Emergency Contact Information  Primary Emergency Contact: Elva Mcwilliams  Mobile Phone: [phone]  Relation: Spouse  Preferred language: English   needed? No  Secondary Emergency Contact: Simona Carrillo  Mobile Phone: [phone]  Relation: Child  Preferred language: English   needed? No    Past Surgical History:  No past surgical history on file.    Immunization History:   Immunization History   Administered Date(s) Administered    COVID-19, PFIZER Bivalent, DO NOT Dilute, (age 12y+), IM, 30 mcg/0.3 mL 2023    COVID-19, PFIZER, ( formula), (age 12y+), IM, 30mcg/0.3mL 2023       Active Problems:  Patient Active Problem List   Diagnosis Code    Acute CVA (cerebrovascular accident) (HCC) I63.9    Ataxia R27.0    HTN (hypertension) I10       Isolation/Infection:   Isolation            No Isolation          Patient Infection Status       None to display            Nurse Assessment:  Last Vital Signs: /64   Pulse 68   Temp 98 °F (36.7 °C) (Oral)   Resp 18   Ht 1.651 m (5' 5\")   Wt 63 kg (139 lb)   SpO2 97%   BMI 23.13 kg/m²     Last documented pain score (0-10 scale): Pain Level: 6  Last Weight:   Wt Readings from Last 1 Encounters:   24 63 kg (139 lb)     Mental Status:  oriented    IV Access:  - None    Nursing Mobility/ADLs:  Walking   Assisted  Transfer  Assisted  Bathing  Assisted  Dressing  Assisted  Toileting  Independent  Feeding  Independent  Med Admin  Independent  Med Delivery   whole    Wound Care Documentation and Therapy:         Elimination:  Continence:   Bowel: Yes  Bladder: Yes  Urinary Catheter: None   Colostomy/Ileostomy/Ileal Conduit: No       Date of Last BM: 2024    Intake/Output Summary (Last 24 hours) at 2024 1439  Last data filed at 2024 0324  Gross per 24 hour   Intake --   Output 550 ml   Net -550 ml     I/O last 3 completed shifts:  In: -   Out: 550 [Urine:550]    Safety Concerns:     At Risk for Falls    Impairments/Disabilities:      None    Nutrition Therapy:  Current Nutrition Therapy:   - Oral Diet:  General    Routes of Feeding: Oral  Liquids: No Restrictions  Daily Fluid Restriction: {[facility] DME Yes amt example:362389893}  Last Modified Barium Swallow with Video (Video Swallowing Test): {Done Not Done Date:}    Treatments at the Time of Hospital Discharge:   Respiratory Treatments: ***  Oxygen Therapy:  is not on home oxygen therapy.  Ventilator:    - No ventilator support    Rehab Therapies: {THERAPEUTIC INTERVENTION:0334649650}  Weight Bearing Status/Restrictions: {[facility] Weight Bearin}  Other Medical Equipment (for information only, NOT a DME order):  {EQUIPMENT:873530993}  Other Treatments: ***    Patient's personal belongings (please select all that are sent with patient):  {[facility] DME Belongings:351623301}    RN SIGNATURE:  Electronically signed by Cain Arcos RN on 24 at 2:53 PM EDT

## 2024-07-12 NOTE — DISCHARGE SUMMARY
Discharge Summary    Name: Chance Blue Sr.  [id number]  YOB: 1934 (Age: 89 y.o.)   Date of Admission: 7/6/2024  Date of Discharge: 7/12/2024  Attending Physician: Noman Lees MD    Discharge Diagnosis:   Principal Problem (Resolved):    TIA (transient ischemic attack)  Active Problems:    Acute CVA (cerebrovascular accident) (HCC)    Ataxia    HTN (hypertension)       Consultations:  IP CONSULT TO TELE-NEUROLOGY  IP CONSULT TO TELE-NEUROLOGY  IP CONSULT TO CARDIOLOGY  IP CONSULT TO CASE MANAGEMENT    Brief Hospital Course by Main Problems:   89 y.o.  male with no significant PMH as patient currently on any medications presents and brought by family for ataxia and difficulty walking and mild dizziness when he woke up at 10 AM. States that he was out in the sun for several hours yesterday planting several tomato trees. He states that when he got up around 10 AM he was slightly weak with mild dizziness and difficulty walking. In the ED, patient is slightly hypertensive with blood pressure of 153/79, not hypoxic, afebrile otherwise normal pulse and respiratory rate. 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 CT brain perfusion was negative for any suggestion for acute infarct noted.  MRI revealed small acute infarction posterior left corona radiata.  Echo revealed EF 65 to 70%, diastolic dysfunction.  A1c 5.7%, lipid panel within normal limits.  Teleneurology recommended high-dose statin, cardiology consult, DAPT ASA 81 mg, Plavix 75 mg, for 21 days.  PT/OT recommended IRF.  Cardiology recommended patient will need outpatient cardiac monitoring with Holter/MCOT, no PFO on TTE, consider outpatient sleep study.  Referral placed for pulmonology for sleep study.  Patient with small amount of blood in urine, will have patient follow-up with urology in office next week to discuss.  Will need CBC and BMP  \"PLT\" in the last 72 hours.      Discharge Medications:     Medication List        START taking these medications      aspirin 81 MG chewable tablet  Take 1 tablet by mouth daily     atorvastatin 80 MG tablet  Commonly known as: LIPITOR  Take 1 tablet by mouth nightly     clopidogrel 75 MG tablet  Commonly known as: PLAVIX  Take 1 tablet by mouth daily for 18 doses     lidocaine 4 % external patch  Place 1 patch onto the skin daily for 5 doses     lisinopril 5 MG tablet  Commonly known as: PRINIVIL;ZESTRIL  Take 1 tablet by mouth daily               Where to Get Your Medications        These medications were sent to [id number] - [place], [place] - [address] - P [phone] - F [phone] [address], [facility] [address]      Phone: [phone]   aspirin 81 MG chewable tablet  atorvastatin 80 MG tablet  clopidogrel 75 MG tablet  lidocaine 4 % external patch  lisinopril 5 MG tablet             DISPOSITION:    Home with Family:       Home with HH/PT/OT/RN:    SNF/LTC: x   SIRI:    OTHER:            Code status: FULL  Recommended diet: cardiac diet  Recommended activity: activity as tolerated  Wound care: None      Follow up with:   PCP : Antoinette, Pcp  Paul Walker MD  [address] [facility]   [facility] [address] [phone]    Follow up in 1 week(s)  New patient referral for PCP care.  Patient recently admitted to hospital and found to have CVA.    Emergency Department  [address] [facility] [address]  Follow up  If symptoms worsen, As needed    Brando Cruz MD  [address] [place], [place] [address]y  [address]  [facility] [id number] [phone]    Follow up in 2 week(s)  Follow-up for continued outpatient cardiac care and outpatient arrhythmia evaluation    Juancarlos Sorensen MD  [address] [place] [address] [id number] [phone]    Follow up in 1 week(s)  New patient referral for outpatient urologic care, patient recently found to have hematuria on

## 2024-07-12 NOTE — CARE COORDINATION
CM noted discharge order. Patient going to [facility], [address] [place] [address], [place] [address], nurse can call report at [phone]. [facility] picking up at 4p. Nurse informed.    Transition of Care Plan:    RUR: 12%  Prior Level of Functioning: Need Assistance  Disposition: IRF  If SNF or IPR: Date FOC offered: 7/10/2024  Date FOC received: 7/10/2024  Accepting facility: [facility]  Date authorization started with reference number: 7/11/2024  Date authorization received and expires: 7/12/2024  Follow up appointments: Per MD  DME needed: n/a  Transportation at discharge: [facility]  [facility] Medicare/ letter given: n/a  Is patient a [facility] and connected with [facility]? yes   If yes, was [facility] transfer form completed and [facility] notified? yes  Caregiver Contact: Spouse  Discharge Caregiver contacted prior to discharge? At bedside  Care Conference needed? no  Barriers to discharge: none

## 2024-07-12 NOTE — PROGRESS NOTES
Pt sajan down to 32. within a 12 minute period pt heart rate increase then decreased back down. Pt asymptomatic denies chest pain and or son. Provider notified no new order at this time.

## 2024-07-12 NOTE — PROGRESS NOTES
..Discharge plan of care/case management plan validated with provider discharge order.     Writer called Nydia Dasilva, report was given to Mara using SBAR. All questions were answered.

## 2024-07-15 ENCOUNTER — HOSPITAL ENCOUNTER (OUTPATIENT)
Facility: HOSPITAL | Age: 89
Setting detail: SPECIMEN
Discharge: HOME | End: 2024-07-18

## 2024-07-15 LAB
ANION GAP SERPL CALC-SCNC: 5 MMOL/L (ref 5–15)
BUN SERPL-MCNC: 12 MG/DL (ref 6–20)
BUN/CREAT SERPL: 19 (ref 12–20)
CA-I BLD-MCNC: 9.4 MG/DL (ref 8.5–10.1)
CHLORIDE SERPL-SCNC: 104 MMOL/L (ref 97–108)
CO2 SERPL-SCNC: 29 MMOL/L (ref 21–32)
CREAT SERPL-MCNC: 0.63 MG/DL (ref 0.7–1.3)
GLUCOSE SERPL-MCNC: 120 MG/DL (ref 65–100)
MAGNESIUM SERPL-MCNC: 2.2 MG/DL (ref 1.6–2.4)
PHOSPHATE SERPL-MCNC: 2.8 MG/DL (ref 2.6–4.7)
POTASSIUM SERPL-SCNC: 3.9 MMOL/L (ref 3.5–5.1)
SODIUM SERPL-SCNC: 138 MMOL/L (ref 136–145)

## 2024-07-15 PROCEDURE — 80048 BASIC METABOLIC PNL TOTAL CA: CPT

## 2024-07-15 PROCEDURE — 84100 ASSAY OF PHOSPHORUS: CPT

## 2024-07-15 PROCEDURE — 83735 ASSAY OF MAGNESIUM: CPT

## 2024-07-19 ENCOUNTER — CLINICAL DOCUMENTATION (OUTPATIENT)
Facility: HOSPITAL | Age: 89
End: 2024-07-19

## 2024-08-15 PROBLEM — R31.29 MICROSCOPIC HEMATURIA: Status: ACTIVE | Noted: 2024-08-15

## 2024-08-19 ENCOUNTER — OFFICE VISIT (OUTPATIENT)
Age: 89
End: 2024-08-19
Payer: OTHER GOVERNMENT

## 2024-08-19 VITALS — DIASTOLIC BLOOD PRESSURE: 64 MMHG | SYSTOLIC BLOOD PRESSURE: 134 MMHG | HEART RATE: 83 BPM

## 2024-08-19 DIAGNOSIS — I63.9 ACUTE CVA (CEREBROVASCULAR ACCIDENT) (HCC): ICD-10-CM

## 2024-08-19 DIAGNOSIS — Z79.01 ANTICOAGULATED BY ANTICOAGULATION TREATMENT: ICD-10-CM

## 2024-08-19 DIAGNOSIS — R31.29 MICROSCOPIC HEMATURIA: Primary | ICD-10-CM

## 2024-08-19 DIAGNOSIS — R31.0 GROSS HEMATURIA: ICD-10-CM

## 2024-08-19 LAB
BILIRUBIN, URINE, POC: NEGATIVE
BLOOD URINE, POC: NEGATIVE
GLUCOSE URINE, POC: NEGATIVE
KETONES, URINE, POC: NEGATIVE
LEUKOCYTE ESTERASE, URINE, POC: NEGATIVE
NITRITE, URINE, POC: NEGATIVE
PH, URINE, POC: 6 (ref 4.6–8)
PROTEIN,URINE, POC: NEGATIVE
SPECIFIC GRAVITY, URINE, POC: 1.01 (ref 1–1.03)
URINALYSIS CLARITY, POC: CLEAR
URINALYSIS COLOR, POC: YELLOW
UROBILINOGEN, POC: NORMAL

## 2024-08-19 PROCEDURE — 1123F ACP DISCUSS/DSCN MKR DOCD: CPT | Performed by: UROLOGY

## 2024-08-19 PROCEDURE — 81003 URINALYSIS AUTO W/O SCOPE: CPT | Performed by: UROLOGY

## 2024-08-19 PROCEDURE — 99204 OFFICE O/P NEW MOD 45 MIN: CPT | Performed by: UROLOGY

## 2024-08-19 RX ORDER — MAGNESIUM OXIDE 400 MG/1
1 TABLET ORAL DAILY
COMMUNITY
Start: 2024-07-23

## 2024-08-19 RX ORDER — PANTOPRAZOLE SODIUM 40 MG/1
TABLET, DELAYED RELEASE ORAL
COMMUNITY
Start: 2024-07-23

## 2024-08-19 RX ORDER — METHOCARBAMOL 500 MG/1
TABLET, FILM COATED ORAL
COMMUNITY
Start: 2024-08-05

## 2024-08-19 NOTE — PROGRESS NOTES
Chief Complaint   Patient presents with    New Patient    Hematuria        /64   Pulse 83      PHQ-9 score is    Negative      1. \"Have you been to the ER, urgent care clinic since your last visit?  Hospitalized since your last visit?\" No    2. \"Have you seen or consulted any other health care providers outside of the [facility] since your last visit?\" No     3. For patients aged 45-75: Has the patient had a colonoscopy / FIT/ Cologuard? Yes - no Care Gap present      If the patient is female:    4. For patients aged 40-74: Has the patient had a mammogram within the past 2 years? NA - based on age or sex      5. For patients aged 21-65: Has the patient had a pap smear? NA - based on age or sex

## 2024-08-19 NOTE — PROGRESS NOTES
HISTORY OF PRESENT ILLNESS  Chance Blue Sr. is a 89 y.o. male   Patient was noted to have microscopic hematuria 5-10 red cells per high-power field and may be an old finding he says he is not sure.  He denied fevers chills flank pain nausea vomiting.  He is on blood thinners after having a stroke but he thinks the blood in his urine prior to the stroke.  He did have 1 episode of gross hematuria after starting anticoagulants  1. Microscopic hematuria  Overview:  UA 7/9/2024 positive for small blood/ Patient on Plavix  Orders:  -     AMB POC URINALYSIS DIP STICK AUTO W/O MICRO  -     Cytology, urine  -     CT ABDOMEN PELVIS WO CONTRAST Additional Contrast? Radiologist Recommendation; Future  2. Acute CVA (cerebrovascular accident) (HCC)  3. Anticoagulated by anticoagulation treatment  4. Gross hematuria        PAST MEDICAL HISTORY  PMHx (including negatives):  has no past medical history on file.   PSurgHx:  has no past surgical history on file.  PSocHx:  reports that he quit smoking about 65 years ago. His smoking use included cigarettes. He started smoking about 75 years ago. He has a 5 pack-year smoking history. He has never used smokeless tobacco.   FamilyHX: No family history on file.    ROS  Review of Systems   Genitourinary:  Positive for hematuria.   All other systems reviewed and are negative.        PHYSICAL EXAM  Physical Exam  Vitals and nursing note reviewed.   Constitutional:       Appearance: Normal appearance.   HENT:      Head: Normocephalic.      Right Ear: Tympanic membrane normal.      Nose: Nose normal.      Mouth/Throat:      Mouth: Mucous membranes are moist.   Eyes:      Pupils: Pupils are equal, round, and reactive to light.   Cardiovascular:      Rate and Rhythm: Normal rate.      Pulses: Normal pulses.   Pulmonary:      Effort: Pulmonary effort is normal.   Abdominal:      General: Abdomen is flat.   Genitourinary:     Comments: Deferred till cystoscopy  Musculoskeletal:         General:

## 2024-08-27 ENCOUNTER — TELEPHONE (OUTPATIENT)
Age: 89
End: 2024-08-27

## 2024-08-27 NOTE — TELEPHONE ENCOUNTER
Brain called in reference to a pathology requisition. He wants to know do we have va referral authorization or was his medicare just billed. He will like a call back in reference to this please.

## 2024-08-28 ENCOUNTER — PROCEDURE VISIT (OUTPATIENT)
Age: 89
End: 2024-08-28

## 2024-08-28 VITALS — DIASTOLIC BLOOD PRESSURE: 92 MMHG | HEART RATE: 79 BPM | SYSTOLIC BLOOD PRESSURE: 189 MMHG

## 2024-08-28 DIAGNOSIS — R31.29 MICROSCOPIC HEMATURIA: Primary | ICD-10-CM

## 2024-08-28 LAB
BILIRUBIN, URINE, POC: NEGATIVE
BLOOD URINE, POC: NEGATIVE
GLUCOSE URINE, POC: NEGATIVE
KETONES, URINE, POC: NEGATIVE
LEUKOCYTE ESTERASE, URINE, POC: NEGATIVE
NITRITE, URINE, POC: NEGATIVE
PH, URINE, POC: 6.5 (ref 4.6–8)
PROTEIN,URINE, POC: NEGATIVE
SPECIFIC GRAVITY, URINE, POC: 1.01 (ref 1–1.03)
URINALYSIS CLARITY, POC: CLEAR
URINALYSIS COLOR, POC: YELLOW
UROBILINOGEN, POC: NORMAL

## 2024-08-28 NOTE — PROGRESS NOTES
HISTORY OF PRESENT ILLNESS  Chance Blue Sr. is a 89 y.o. male     1. Microscopic hematuria  Overview:  UA 7/9/2024 positive for small blood/ Patient on Plavix  Ct not done   Urine cytology pending  Orders:  -     AMB POC URINALYSIS DIP STICK AUTO W/O MICRO        PAST MEDICAL HISTORY  PMHx (including negatives):  has no past medical history on file.   PSurgHx:  has a past surgical history that includes Cystocopy (08/28/2024).  PSocHx:  reports that he quit smoking about 65 years ago. His smoking use included cigarettes. He started smoking about 75 years ago. He has a 5 pack-year smoking history. He has never used smokeless tobacco.   FamilyHX: No family history on file.    ROS  Review of Systems      PHYSICAL EXAM  Physical Exam    ASSESSMENT and PLAN  1. Microscopic hematuria  -     AMB POC URINALYSIS DIP STICK AUTO W/O MICRO        Ben Shrestha MD      Please note that portions of this note was potentially completed with Dragon dictation, the computer voice recognition software.  Quite often unanticipated grammatical, syntax, homophones, and other interpretive errors are inadvertently transcribed by the computer software.  Please disregard these errors.  Please excuse any errors that have escaped final proofreading.  Thank you.

## 2024-08-28 NOTE — PROGRESS NOTES
Chief Complaint   Patient presents with    Procedure        BP (!) 189/92   Pulse 79      PHQ-9 score is    Negative      1. \"Have you been to the ER, urgent care clinic since your last visit?  Hospitalized since your last visit?\" No    2. \"Have you seen or consulted any other health care providers outside of the [facility] since your last visit?\" No     3. For patients aged 45-75: Has the patient had a colonoscopy / FIT/ Cologuard? NA - based on age      If the patient is female:    4. For patients aged 40-74: Has the patient had a mammogram within the past 2 years? NA - based on age or sex      5. For patients aged 21-65: Has the patient had a pap smear? NA - based on age or sex

## 2024-08-28 NOTE — PROGRESS NOTES
OFFICE CYSTOSCOPY    The patient presented for cystoscopy and was placed supine on the procedure table.  The genitals were prepped with chlorahexadine and a Urojet.  Using a 16F flexible cystoscope, cystourerthroscopy was performed.  Cystoscopy - Male    Findings:    Initial residual: minimal  Anterior urethra:  Pendulous urethra patent without strictures  Prostate:  coapting lateral lobes  Bladder neck: Normal appearing  Bladder mucosa: no tumors  no erythema      Trabeculation: 4 plus  Diverticula: Many no lesions seen in any  Ureteral orifices: normal, efflux clear urine    Impression: Medium size prostate 4+ trabeculated bladder no source of hematuria i.e. bladder tumors

## 2024-08-30 ENCOUNTER — HOSPITAL ENCOUNTER (OUTPATIENT)
Facility: HOSPITAL | Age: 89
End: 2024-08-30
Attending: UROLOGY
Payer: MEDICARE

## 2024-08-30 DIAGNOSIS — R31.29 MICROSCOPIC HEMATURIA: ICD-10-CM

## 2024-08-30 PROCEDURE — 74176 CT ABD & PELVIS W/O CONTRAST: CPT

## 2024-09-03 ENCOUNTER — TELEPHONE (OUTPATIENT)
Age: 89
End: 2024-09-03

## 2024-09-11 ENCOUNTER — OFFICE VISIT (OUTPATIENT)
Age: 89
End: 2024-09-11
Payer: MEDICARE

## 2024-09-11 VITALS
BODY MASS INDEX: 23.16 KG/M2 | HEART RATE: 75 BPM | WEIGHT: 139 LBS | DIASTOLIC BLOOD PRESSURE: 68 MMHG | SYSTOLIC BLOOD PRESSURE: 120 MMHG | HEIGHT: 65 IN

## 2024-09-11 DIAGNOSIS — Z79.01 ANTICOAGULATED BY ANTICOAGULATION TREATMENT: ICD-10-CM

## 2024-09-11 DIAGNOSIS — R31.0 GROSS HEMATURIA: ICD-10-CM

## 2024-09-11 DIAGNOSIS — R31.29 MICROSCOPIC HEMATURIA: Primary | ICD-10-CM

## 2024-09-11 DIAGNOSIS — R35.0 FREQUENCY OF MICTURITION: ICD-10-CM

## 2024-09-11 LAB
BILIRUBIN, URINE, POC: NEGATIVE
BLOOD URINE, POC: NEGATIVE
GLUCOSE URINE, POC: NEGATIVE
KETONES, URINE, POC: NEGATIVE
LEUKOCYTE ESTERASE, URINE, POC: ABNORMAL
NITRITE, URINE, POC: NEGATIVE
PH, URINE, POC: 6.5 (ref 4.6–8)
PROTEIN,URINE, POC: NEGATIVE
SPECIFIC GRAVITY, URINE, POC: 1.01 (ref 1–1.03)
URINALYSIS CLARITY, POC: CLEAR
URINALYSIS COLOR, POC: YELLOW
UROBILINOGEN, POC: ABNORMAL

## 2024-09-11 PROCEDURE — 81003 URINALYSIS AUTO W/O SCOPE: CPT | Performed by: UROLOGY

## 2024-09-11 PROCEDURE — 99213 OFFICE O/P EST LOW 20 MIN: CPT | Performed by: UROLOGY

## 2024-09-11 PROCEDURE — 1123F ACP DISCUSS/DSCN MKR DOCD: CPT | Performed by: UROLOGY

## 2024-09-11 RX ORDER — ROSUVASTATIN CALCIUM 40 MG/1
40 TABLET, COATED ORAL
COMMUNITY
Start: 2024-08-06

## 2024-09-11 RX ORDER — OLOPATADINE HYDROCHLORIDE 1 MG/ML
SOLUTION/ DROPS OPHTHALMIC
COMMUNITY
Start: 2024-01-19

## 2024-09-11 RX ORDER — ALBUTEROL SULFATE 0.83 MG/ML
SOLUTION RESPIRATORY (INHALATION)
COMMUNITY
Start: 2024-08-06

## 2024-09-11 RX ORDER — TIOTROPIUM BROMIDE 18 UG/1
CAPSULE ORAL; RESPIRATORY (INHALATION)
COMMUNITY
Start: 2024-01-08

## 2024-12-02 ENCOUNTER — HOSPITAL ENCOUNTER (OUTPATIENT)
Facility: HOSPITAL | Age: 88
Discharge: HOME | End: 2024-12-05
Payer: MEDICARE

## 2024-12-02 VITALS
SYSTOLIC BLOOD PRESSURE: 136 MMHG | HEIGHT: 65 IN | TEMPERATURE: 97.7 F | RESPIRATION RATE: 18 BRPM | DIASTOLIC BLOOD PRESSURE: 77 MMHG | HEART RATE: 82 BPM | BODY MASS INDEX: 23.13 KG/M2 | OXYGEN SATURATION: 100 %

## 2024-12-02 LAB
ALBUMIN SERPL-MCNC: 3.8 G/DL (ref 3.5–5)
ALBUMIN/GLOB SERPL: 1.4 (ref 1.1–2.2)
ALP SERPL-CCNC: 78 U/L (ref 45–117)
ALT SERPL-CCNC: 23 U/L (ref 12–78)
ANION GAP SERPL CALC-SCNC: 8 MMOL/L (ref 2–12)
APTT PPP: 37.4 SEC (ref 21.2–34.1)
AST SERPL W P-5'-P-CCNC: 16 U/L (ref 15–37)
BILIRUB SERPL-MCNC: 0.6 MG/DL (ref 0.2–1)
BUN SERPL-MCNC: 13 MG/DL (ref 6–20)
BUN/CREAT SERPL: 12 (ref 12–20)
CA-I BLD-MCNC: 9.2 MG/DL (ref 8.5–10.1)
CHLORIDE SERPL-SCNC: 106 MMOL/L (ref 97–108)
CO2 SERPL-SCNC: 27 MMOL/L (ref 21–32)
CREAT SERPL-MCNC: 1.05 MG/DL (ref 0.7–1.3)
ERYTHROCYTE [DISTWIDTH] IN BLOOD BY AUTOMATED COUNT: 12.6 % (ref 11.5–14.5)
GLOBULIN SER CALC-MCNC: 2.8 G/DL (ref 2–4)
GLUCOSE SERPL-MCNC: 92 MG/DL (ref 65–100)
HCT VFR BLD AUTO: 42.8 % (ref 36.6–50.3)
HGB BLD-MCNC: 14.2 G/DL (ref 12.1–17)
INR PPP: 1 (ref 0.9–1.1)
MCH RBC QN AUTO: 31.5 PG (ref 26–34)
MCHC RBC AUTO-ENTMCNC: 33.2 G/DL (ref 30–36.5)
MCV RBC AUTO: 94.9 FL (ref 80–99)
NRBC # BLD: 0 K/UL (ref 0–0.01)
NRBC BLD-RTO: 0 PER 100 WBC
PLATELET # BLD AUTO: 220 K/UL (ref 150–400)
PMV BLD AUTO: 9.5 FL (ref 8.9–12.9)
POTASSIUM SERPL-SCNC: 4.2 MMOL/L (ref 3.5–5.1)
PROT SERPL-MCNC: 6.6 G/DL (ref 6.4–8.2)
PROTHROMBIN TIME: 13.3 SEC (ref 11.9–14.6)
RBC # BLD AUTO: 4.51 M/UL (ref 4.1–5.7)
SODIUM SERPL-SCNC: 141 MMOL/L (ref 136–145)
THERAPEUTIC RANGE: ABNORMAL SEC (ref 82–109)
WBC # BLD AUTO: 6.3 K/UL (ref 4.1–11.1)

## 2024-12-02 PROCEDURE — 85610 PROTHROMBIN TIME: CPT

## 2024-12-02 PROCEDURE — 80053 COMPREHEN METABOLIC PANEL: CPT

## 2024-12-02 PROCEDURE — 85730 THROMBOPLASTIN TIME PARTIAL: CPT

## 2024-12-02 PROCEDURE — 85027 COMPLETE CBC AUTOMATED: CPT

## 2024-12-02 PROCEDURE — 36415 COLL VENOUS BLD VENIPUNCTURE: CPT

## 2024-12-02 PROCEDURE — 93005 ELECTROCARDIOGRAM TRACING: CPT | Performed by: STUDENT IN AN ORGANIZED HEALTH CARE EDUCATION/TRAINING PROGRAM

## 2024-12-02 RX ORDER — TAMSULOSIN HYDROCHLORIDE 0.4 MG/1
0.4 CAPSULE ORAL DAILY
COMMUNITY

## 2024-12-02 RX ORDER — DOCUSATE SODIUM 100 MG/1
100 CAPSULE, LIQUID FILLED ORAL DAILY PRN
COMMUNITY

## 2024-12-02 RX ORDER — ASPIRIN 325 MG
325 TABLET ORAL DAILY
COMMUNITY
End: 2024-12-02

## 2024-12-03 LAB
EKG ATRIAL RATE: 73 BPM
EKG DIAGNOSIS: NORMAL
EKG P AXIS: 94 DEGREES
EKG P-R INTERVAL: 256 MS
EKG Q-T INTERVAL: 432 MS
EKG QRS DURATION: 134 MS
EKG QTC CALCULATION (BAZETT): 475 MS
EKG R AXIS: -65 DEGREES
EKG T AXIS: 31 DEGREES
EKG VENTRICULAR RATE: 73 BPM

## 2024-12-09 ENCOUNTER — HOSPITAL ENCOUNTER (OUTPATIENT)
Facility: HOSPITAL | Age: 88
Discharge: HOME | End: 2024-12-09
Attending: INTERNAL MEDICINE | Admitting: INTERNAL MEDICINE
Payer: MEDICARE

## 2024-12-09 VITALS
TEMPERATURE: 98 F | HEART RATE: 77 BPM | OXYGEN SATURATION: 100 % | RESPIRATION RATE: 18 BRPM | SYSTOLIC BLOOD PRESSURE: 153 MMHG | DIASTOLIC BLOOD PRESSURE: 84 MMHG

## 2024-12-09 DIAGNOSIS — R06.00 DYSPNEA, UNSPECIFIED TYPE: ICD-10-CM

## 2024-12-09 PROCEDURE — 7100000001 HC PACU RECOVERY - ADDTL 15 MIN: Performed by: STUDENT IN AN ORGANIZED HEALTH CARE EDUCATION/TRAINING PROGRAM

## 2024-12-09 PROCEDURE — C1769 GUIDE WIRE: HCPCS | Performed by: STUDENT IN AN ORGANIZED HEALTH CARE EDUCATION/TRAINING PROGRAM

## 2024-12-09 PROCEDURE — 7100000011 HC PHASE II RECOVERY - ADDTL 15 MIN: Performed by: STUDENT IN AN ORGANIZED HEALTH CARE EDUCATION/TRAINING PROGRAM

## 2024-12-09 PROCEDURE — 2709999900 HC NON-CHARGEABLE SUPPLY: Performed by: STUDENT IN AN ORGANIZED HEALTH CARE EDUCATION/TRAINING PROGRAM

## 2024-12-09 PROCEDURE — 6360000004 HC RX CONTRAST MEDICATION: Performed by: STUDENT IN AN ORGANIZED HEALTH CARE EDUCATION/TRAINING PROGRAM

## 2024-12-09 PROCEDURE — 7100000010 HC PHASE II RECOVERY - FIRST 15 MIN: Performed by: STUDENT IN AN ORGANIZED HEALTH CARE EDUCATION/TRAINING PROGRAM

## 2024-12-09 PROCEDURE — 7100000000 HC PACU RECOVERY - FIRST 15 MIN: Performed by: STUDENT IN AN ORGANIZED HEALTH CARE EDUCATION/TRAINING PROGRAM

## 2024-12-09 PROCEDURE — C1894 INTRO/SHEATH, NON-LASER: HCPCS | Performed by: STUDENT IN AN ORGANIZED HEALTH CARE EDUCATION/TRAINING PROGRAM

## 2024-12-09 PROCEDURE — 6360000002 HC RX W HCPCS: Performed by: STUDENT IN AN ORGANIZED HEALTH CARE EDUCATION/TRAINING PROGRAM

## 2024-12-09 PROCEDURE — 2500000003 HC RX 250 WO HCPCS: Performed by: STUDENT IN AN ORGANIZED HEALTH CARE EDUCATION/TRAINING PROGRAM

## 2024-12-09 PROCEDURE — 99152 MOD SED SAME PHYS/QHP 5/>YRS: CPT | Performed by: STUDENT IN AN ORGANIZED HEALTH CARE EDUCATION/TRAINING PROGRAM

## 2024-12-09 PROCEDURE — 93458 L HRT ARTERY/VENTRICLE ANGIO: CPT | Performed by: STUDENT IN AN ORGANIZED HEALTH CARE EDUCATION/TRAINING PROGRAM

## 2024-12-09 RX ORDER — FENTANYL CITRATE 50 UG/ML
INJECTION, SOLUTION INTRAMUSCULAR; INTRAVENOUS PRN
Status: DISCONTINUED | OUTPATIENT
Start: 2024-12-09 | End: 2024-12-09 | Stop reason: HOSPADM

## 2024-12-09 RX ORDER — HEPARIN SODIUM 1000 [USP'U]/ML
INJECTION, SOLUTION INTRAVENOUS; SUBCUTANEOUS PRN
Status: DISCONTINUED | OUTPATIENT
Start: 2024-12-09 | End: 2024-12-09 | Stop reason: HOSPADM

## 2024-12-09 RX ORDER — ACETAMINOPHEN 325 MG/1
650 TABLET ORAL EVERY 4 HOURS PRN
Status: DISCONTINUED | OUTPATIENT
Start: 2024-12-09 | End: 2024-12-09 | Stop reason: HOSPADM

## 2024-12-09 RX ORDER — SODIUM CHLORIDE 9 MG/ML
INJECTION, SOLUTION INTRAVENOUS PRN
Status: DISCONTINUED | OUTPATIENT
Start: 2024-12-09 | End: 2024-12-09 | Stop reason: HOSPADM

## 2024-12-09 RX ORDER — MIDAZOLAM HYDROCHLORIDE 1 MG/ML
INJECTION, SOLUTION INTRAMUSCULAR; INTRAVENOUS PRN
Status: DISCONTINUED | OUTPATIENT
Start: 2024-12-09 | End: 2024-12-09 | Stop reason: HOSPADM

## 2024-12-09 RX ORDER — SODIUM CHLORIDE 0.9 % (FLUSH) 0.9 %
5-40 SYRINGE (ML) INJECTION EVERY 12 HOURS SCHEDULED
Status: DISCONTINUED | OUTPATIENT
Start: 2024-12-09 | End: 2024-12-09 | Stop reason: HOSPADM

## 2024-12-09 RX ORDER — VERAPAMIL HYDROCHLORIDE 2.5 MG/ML
INJECTION, SOLUTION INTRAVENOUS PRN
Status: DISCONTINUED | OUTPATIENT
Start: 2024-12-09 | End: 2024-12-09 | Stop reason: HOSPADM

## 2024-12-09 RX ORDER — SODIUM CHLORIDE 0.9 % (FLUSH) 0.9 %
5-40 SYRINGE (ML) INJECTION PRN
Status: DISCONTINUED | OUTPATIENT
Start: 2024-12-09 | End: 2024-12-09 | Stop reason: HOSPADM

## 2024-12-09 RX ORDER — IOPAMIDOL 755 MG/ML
INJECTION, SOLUTION INTRAVASCULAR PRN
Status: DISCONTINUED | OUTPATIENT
Start: 2024-12-09 | End: 2024-12-09 | Stop reason: HOSPADM

## 2024-12-09 RX ORDER — LIDOCAINE HYDROCHLORIDE 10 MG/ML
INJECTION, SOLUTION INFILTRATION; PERINEURAL PRN
Status: DISCONTINUED | OUTPATIENT
Start: 2024-12-09 | End: 2024-12-09 | Stop reason: HOSPADM

## 2024-12-09 ASSESSMENT — PAIN - FUNCTIONAL ASSESSMENT
PAIN_FUNCTIONAL_ASSESSMENT: 0-10
PAIN_FUNCTIONAL_ASSESSMENT: 0-10

## 2024-12-09 NOTE — DISCHARGE INSTRUCTIONS
[facility]  Cardiac Catheterization Department  [address] [place], [place] [id number] [phone]        Coronary Angiogram: What to Expect at Home  Your Recovery  A coronary angiogram is a test to examine the large blood vessels of your heart (coronary arteries).  The doctor inserted a thin, flexible tube (catheter into a blood vessel in your groin or wrist.  Your groin or wrist may have a bruise and feel sore for a few days after the procedure.  You can do light activities around the house.  But do not do anything strenuous until your doctor says it is ok.  This may be for several days.  This care sheet gives you a general idea about how long it will take for you to recover.  But each person recovers at a different pace.  Follow the steps below to feel better as quickly as possible.    How can you care for yourself at home?    Activity  If the doctor gave you a sedative:  For 24 hours, don't do anything that requires attention to detail, such as going to work, making important decisions, or signing any legal documents.  It takes time for the medicine's effects to completely wear off.  For your safety, do not drive or operate any machinery that could be dangerous.  Wait until the medicine wears off and you can think clearly and react easily.  Do not do any strenuous exercise and do not lift, pull, or push anything heavier than 5 pounds (approximately the weight of 1 gallon of milk) until your doctor says it is ok.  This may be for several days.  You can walk around the house and do light activity, such as cooking.  If the catheter was placed in your groin and you must use stairs, just go up and down slowly in as few trips as possible for the first couple of days.  If the catheter was placed in your wrist, do not bend your wrist deeply for the first couple of days.  A soft wrist-splint may be placed on your wrist as a reminder following the procedure.  Be careful using your hand to get

## 2024-12-09 NOTE — H&P
[facility] CARDIOLOGY  CARDIOLOGY H&P    REASON FOR CONSULT:  elective coronary angiogram    REQUESTING PROVIDER:  Dr. Crawford    CHIEF COMPLAINT:  dyspnea    HISTORY OF PRESENT ILLNESS:  Mr. Blue is a 89 year-old gentleman who returns today for follow-up for dyspnea. The patient reports having intermittent shortness of breath symptoms for the past few weeks. The severity is mild. No chest pain noted. There are no associated signs and symptoms. No palpitations or dizziness. No nausea or vomiting. He reports edema in his unilateral lower extremity specifically his right foot. He is able to complete ADLs. There are no additional issues or complaints.     Records from cardiology clinic reviewed     Medications reviewed, no side effects. No recent medication changes noted. The patient reports former tobacco use. No heavy alcohol use.    PAST MEDICAL HISTORY:  Notes above.    Past Medical History:   Diagnosis Date    Cerebral artery occlusion with cerebral infarction (HCC) 07/07/2024    COPD (chronic obstructive pulmonary disease) ([facility])     Hyperlipidemia     Macular degeneration     Skin cancer        HOME MEDICATIONS:  Home medications reviewed, no side effects.      ALLERGIES:  Allergies reviewed with the patient.      FAMILY HISTORY:  Family history reviewed, no premature cardiac disease.      SOCIAL HISTORY:  Notable for prior tobacco use, no heavy alcohol or illicit drug use.      REVIEW OF SYSTEMS:  Complete review of systems performed, pertinents noted above, all other systems are negative.      PHYSICAL EXAMINATION:   /85   Pulse 74   Temp 97.6 °F (36.4 °C) (Oral)   Resp 16   SpO2 100%   General: not in acute distress  Neck: no JVD  Cardiac: regular rate and rhythm, S1, S2, no murmurs/rubs/gallops  Respiratory: clear to auscultation bilaterally, no wheeze, rales or rhonchi  Abdomen: soft, non tender, non distended  Neuro: alert and oriented, cooperative, calm, no focal deficits  Extremities: no

## 2024-12-12 NOTE — PROGRESS NOTES
Post Cardiac Cath Follow Up Phone Call    Are you having pain today?    No    Have you had any bruising/bleeding/oozing/swelling at the cath site?    No     Are you having any numbness or tingling in the fingers or hands?    No     Reinforced compliance with prescribed medications, discharge instructions, and follow up appointment. Patient verbalized understanding.      Do you have any additional questions or concerns?  None at this time

## 2025-04-11 ENCOUNTER — TRANSCRIBE ORDERS (OUTPATIENT)
Facility: HOSPITAL | Age: 89
End: 2025-04-11

## 2025-04-11 DIAGNOSIS — M79.606 PAIN OF LOWER EXTREMITY, UNSPECIFIED LATERALITY: Primary | ICD-10-CM

## 2025-05-01 ENCOUNTER — APPOINTMENT (OUTPATIENT)
Facility: HOSPITAL | Age: 89
End: 2025-05-01
Payer: MEDICARE

## 2025-05-01 ENCOUNTER — HOSPITAL ENCOUNTER (OUTPATIENT)
Facility: HOSPITAL | Age: 89
Discharge: HOME | End: 2025-05-03
Payer: MEDICARE

## 2025-05-01 DIAGNOSIS — M79.606 PAIN OF LOWER EXTREMITY, UNSPECIFIED LATERALITY: ICD-10-CM

## 2025-05-01 LAB
VAS LEFT ABI: 1.09
VAS LEFT ARM BP: 152 MMHG
VAS LEFT DORSALIS PEDIS BP: 157 MMHG
VAS LEFT PTA BP: 165 MMHG
VAS LEFT TBI: 0.48
VAS LEFT TOE PRESSURE: 73 MMHG
VAS RIGHT ABI: 1.05
VAS RIGHT ARM BP: 152 MMHG
VAS RIGHT DORSALIS PEDIS BP: 143 MMHG
VAS RIGHT PTA BP: 160 MMHG
VAS RIGHT TBI: 0.53
VAS RIGHT TOE PRESSURE: 80 MMHG

## 2025-05-01 PROCEDURE — 93923 UPR/LXTR ART STDY 3+ LVLS: CPT

## (undated) DEVICE — CATHETER DIAG 5FR L100CM LUMN ID0.047IN JR4 CRV 0 SIDE H

## (undated) DEVICE — GLIDESHEATH SLENDER STAINLESS STEEL KIT: Brand: GLIDESHEATH SLENDER

## (undated) DEVICE — 48" PROBE COVER W/GEL, ULTRASOUND, STERILE: Brand: SITE-RITE

## (undated) DEVICE — SYRINGE MED 10ML RED POLYCARB BRL FIX M LUER CONN FLAT GRP

## (undated) DEVICE — SYRINGE ANGIO 10 CC POLYCARB DK GRN MEDALLION DISP

## (undated) DEVICE — SC 3W MP RA OFF PB - PG: Brand: NAMIC

## (undated) DEVICE — BAND COMPR L24CM REG CLR PLAS HEMSTAT EXT HK AND LOOP RETEN

## (undated) DEVICE — WASTEBAG DRIP/ADAPTER: Brand: MEDLINE INDUSTRIES, INC.

## (undated) DEVICE — TRAY SURG CUST CRD CATH 3 PRT SSR

## (undated) DEVICE — DRAPE,APERTURE,MINOR PROCEDURE: Brand: MEDLINE

## (undated) DEVICE — GUIDEWIRE VASC L260CM DIA0.035IN TIP L3MM STD EXCHG PTFE J

## (undated) DEVICE — CATHETER DIAG 5FR L100CM LUMN ID0.047IN JL3.5 CRV 0 SIDE H

## (undated) DEVICE — 3M™ TEGADERM™ I.V. ADVANCED SECUREMENT DRESSING, 1685, 3-1/2 IN X 4-1/2 IN (8.5 CM X 11.5 CM), 50/CT 4CT/CASE: Brand: 3M™ TEGADERM™

## (undated) DEVICE — SYRINGE MED 10ML PUR GAM COMPATIBLE POLYCARB FIX M LUER CONN

## (undated) DEVICE — BOWL MED M 16OZ PLAS CAP GRAD